# Patient Record
Sex: FEMALE | Race: WHITE | NOT HISPANIC OR LATINO | URBAN - METROPOLITAN AREA
[De-identification: names, ages, dates, MRNs, and addresses within clinical notes are randomized per-mention and may not be internally consistent; named-entity substitution may affect disease eponyms.]

---

## 2017-10-16 ENCOUNTER — EMERGENCY (EMERGENCY)
Facility: HOSPITAL | Age: 32
LOS: 1 days | Discharge: HOME | End: 2017-10-16
Admitting: EMERGENCY MEDICINE
Payer: MEDICAID

## 2017-10-16 VITALS
DIASTOLIC BLOOD PRESSURE: 99 MMHG | HEART RATE: 77 BPM | TEMPERATURE: 99 F | RESPIRATION RATE: 16 BRPM | SYSTOLIC BLOOD PRESSURE: 153 MMHG

## 2017-10-16 VITALS
DIASTOLIC BLOOD PRESSURE: 88 MMHG | TEMPERATURE: 98 F | SYSTOLIC BLOOD PRESSURE: 146 MMHG | RESPIRATION RATE: 16 BRPM | HEART RATE: 60 BPM | OXYGEN SATURATION: 100 %

## 2017-10-16 DIAGNOSIS — Z98.890 OTHER SPECIFIED POSTPROCEDURAL STATES: Chronic | ICD-10-CM

## 2017-10-16 LAB
ALBUMIN SERPL ELPH-MCNC: 4.4 G/DL — SIGNIFICANT CHANGE UP (ref 3.3–5)
ALP SERPL-CCNC: 104 U/L — SIGNIFICANT CHANGE UP (ref 40–120)
ALT FLD-CCNC: 23 U/L — SIGNIFICANT CHANGE UP (ref 4–33)
APPEARANCE UR: CLEAR — SIGNIFICANT CHANGE UP
AST SERPL-CCNC: 20 U/L — SIGNIFICANT CHANGE UP (ref 4–32)
BACTERIA # UR AUTO: SIGNIFICANT CHANGE UP
BASOPHILS # BLD AUTO: 0.04 K/UL — SIGNIFICANT CHANGE UP (ref 0–0.2)
BASOPHILS NFR BLD AUTO: 0.4 % — SIGNIFICANT CHANGE UP (ref 0–2)
BILIRUB SERPL-MCNC: 0.4 MG/DL — SIGNIFICANT CHANGE UP (ref 0.2–1.2)
BILIRUB UR-MCNC: NEGATIVE — SIGNIFICANT CHANGE UP
BLOOD UR QL VISUAL: NEGATIVE — SIGNIFICANT CHANGE UP
BUN SERPL-MCNC: 11 MG/DL — SIGNIFICANT CHANGE UP (ref 7–23)
CALCIUM SERPL-MCNC: 9.1 MG/DL — SIGNIFICANT CHANGE UP (ref 8.4–10.5)
CHLORIDE SERPL-SCNC: 106 MMOL/L — SIGNIFICANT CHANGE UP (ref 98–107)
CO2 SERPL-SCNC: 23 MMOL/L — SIGNIFICANT CHANGE UP (ref 22–31)
COLOR SPEC: SIGNIFICANT CHANGE UP
CREAT SERPL-MCNC: 0.73 MG/DL — SIGNIFICANT CHANGE UP (ref 0.5–1.3)
EOSINOPHIL # BLD AUTO: 0.22 K/UL — SIGNIFICANT CHANGE UP (ref 0–0.5)
EOSINOPHIL NFR BLD AUTO: 2 % — SIGNIFICANT CHANGE UP (ref 0–6)
GLUCOSE SERPL-MCNC: 94 MG/DL — SIGNIFICANT CHANGE UP (ref 70–99)
GLUCOSE UR-MCNC: NEGATIVE — SIGNIFICANT CHANGE UP
HCT VFR BLD CALC: 40.4 % — SIGNIFICANT CHANGE UP (ref 34.5–45)
HGB BLD-MCNC: 13.5 G/DL — SIGNIFICANT CHANGE UP (ref 11.5–15.5)
IMM GRANULOCYTES # BLD AUTO: 0.05 # — SIGNIFICANT CHANGE UP
IMM GRANULOCYTES NFR BLD AUTO: 0.4 % — SIGNIFICANT CHANGE UP (ref 0–1.5)
KETONES UR-MCNC: NEGATIVE — SIGNIFICANT CHANGE UP
LEUKOCYTE ESTERASE UR-ACNC: HIGH
LYMPHOCYTES # BLD AUTO: 2.23 K/UL — SIGNIFICANT CHANGE UP (ref 1–3.3)
LYMPHOCYTES # BLD AUTO: 20 % — SIGNIFICANT CHANGE UP (ref 13–44)
MCHC RBC-ENTMCNC: 30.5 PG — SIGNIFICANT CHANGE UP (ref 27–34)
MCHC RBC-ENTMCNC: 33.4 % — SIGNIFICANT CHANGE UP (ref 32–36)
MCV RBC AUTO: 91.2 FL — SIGNIFICANT CHANGE UP (ref 80–100)
MONOCYTES # BLD AUTO: 0.34 K/UL — SIGNIFICANT CHANGE UP (ref 0–0.9)
MONOCYTES NFR BLD AUTO: 3.1 % — SIGNIFICANT CHANGE UP (ref 2–14)
MUCOUS THREADS # UR AUTO: SIGNIFICANT CHANGE UP
NEUTROPHILS # BLD AUTO: 8.25 K/UL — HIGH (ref 1.8–7.4)
NEUTROPHILS NFR BLD AUTO: 74.1 % — SIGNIFICANT CHANGE UP (ref 43–77)
NITRITE UR-MCNC: NEGATIVE — SIGNIFICANT CHANGE UP
NON-SQ EPI CELLS # UR AUTO: <1 — SIGNIFICANT CHANGE UP
NRBC # FLD: 0 — SIGNIFICANT CHANGE UP
PH UR: 6 — SIGNIFICANT CHANGE UP (ref 4.6–8)
PLATELET # BLD AUTO: 274 K/UL — SIGNIFICANT CHANGE UP (ref 150–400)
PMV BLD: 11.2 FL — SIGNIFICANT CHANGE UP (ref 7–13)
POTASSIUM SERPL-MCNC: 4.2 MMOL/L — SIGNIFICANT CHANGE UP (ref 3.5–5.3)
POTASSIUM SERPL-SCNC: 4.2 MMOL/L — SIGNIFICANT CHANGE UP (ref 3.5–5.3)
PROT SERPL-MCNC: 7 G/DL — SIGNIFICANT CHANGE UP (ref 6–8.3)
PROT UR-MCNC: NEGATIVE — SIGNIFICANT CHANGE UP
RBC # BLD: 4.43 M/UL — SIGNIFICANT CHANGE UP (ref 3.8–5.2)
RBC # FLD: 11.5 % — SIGNIFICANT CHANGE UP (ref 10.3–14.5)
RBC CASTS # UR COMP ASSIST: SIGNIFICANT CHANGE UP (ref 0–?)
SODIUM SERPL-SCNC: 144 MMOL/L — SIGNIFICANT CHANGE UP (ref 135–145)
SP GR SPEC: 1.01 — SIGNIFICANT CHANGE UP (ref 1–1.03)
SQUAMOUS # UR AUTO: SIGNIFICANT CHANGE UP
UROBILINOGEN FLD QL: NORMAL E.U. — SIGNIFICANT CHANGE UP (ref 0.1–0.2)
WBC # BLD: 11.13 K/UL — HIGH (ref 3.8–10.5)
WBC # FLD AUTO: 11.13 K/UL — HIGH (ref 3.8–10.5)
WBC UR QL: SIGNIFICANT CHANGE UP (ref 0–?)

## 2017-10-16 PROCEDURE — 99285 EMERGENCY DEPT VISIT HI MDM: CPT

## 2017-10-16 PROCEDURE — 74177 CT ABD & PELVIS W/CONTRAST: CPT | Mod: 26

## 2017-10-16 PROCEDURE — 76830 TRANSVAGINAL US NON-OB: CPT | Mod: 26

## 2017-10-16 RX ORDER — SODIUM CHLORIDE 9 MG/ML
1000 INJECTION INTRAMUSCULAR; INTRAVENOUS; SUBCUTANEOUS ONCE
Qty: 0 | Refills: 0 | Status: COMPLETED | OUTPATIENT
Start: 2017-10-16 | End: 2017-10-16

## 2017-10-16 RX ADMIN — SODIUM CHLORIDE 1000 MILLILITER(S): 9 INJECTION INTRAMUSCULAR; INTRAVENOUS; SUBCUTANEOUS at 15:35

## 2017-10-16 NOTE — ED ADULT TRIAGE NOTE - CHIEF COMPLAINT QUOTE
pt employee, c/o LLQ pain with nausea and diarrhea on and off x 1 week with worsening today while at work.

## 2017-10-16 NOTE — ED ADULT NURSE NOTE - OBJECTIVE STATEMENT
pt presents to intake R#7 Aox4, in NAD, c/o LLQ pain x1 week, nonradiating, "cramping", worse today, with associated nausea, vomitingx1 episode, diarrhea x2. Denies fevers/ chills/ urinary symptoms/ cough/ other acute complaints. VSS. IV inserted, Bw collected and sent to lab. Awaiting US. Will continue to monitor.

## 2017-10-16 NOTE — ED PROVIDER NOTE - OBJECTIVE STATEMENT
31 year old female, PMHx of ovarian cysts; presents to the ED complaining of LLQ abdominal pain x 1 week. Patient states she has had intermittent , sharp, cramping, non-radiating abdominal pain x 1 week. 31 year old female, PMHx of ovarian cysts; presents to the ED complaining of LLQ abdominal pain x 1 week. Patient states she has had intermittent , sharp, cramping, non-radiating abdominal pain x 1 week. She states she was at work today when the pain occurred suddenly and severely, doubling her over and accompanied by one episode of NBNB vomiting. She endorses three days of diarrhea but states she gets diarrhea several times a month so she didn't attribute it to the pain. She denies chest pain, shortness of breath, recent travel, sick contacts, fevers, chills, or other complaints. Pain as now decreased significantly without intervention 3/10.

## 2017-10-16 NOTE — ED PROVIDER NOTE - PROGRESS NOTE DETAILS
JODY MOSQUEDA MD:   I was available for consultation or to see the patient directly regarding patient care at any point during the interview conducted with patient, regarding history, symptoms as well as physical exam.   I was also available to discuss or assist with the plan of care, review results of any testing and decide on disposition.

## 2017-10-16 NOTE — ED PROVIDER NOTE - CARE PLAN
Principal Discharge DX:	Ovarian cyst  Instructions for follow-up, activity and diet:	pls rest, drink plenty of fluids, motrin every 6-8 hours for pain, f/u with gyn, return for any worsening symptoms or any other concerning symptoms

## 2017-10-16 NOTE — ED PROVIDER NOTE - CHPI ED SYMPTOMS NEG
no fever/no blood in stool/no hematuria/no dysuria/no abdominal distension/no chills/no burning urination

## 2017-10-16 NOTE — ED PROVIDER NOTE - PLAN OF CARE
pls rest, drink plenty of fluids, motrin every 6-8 hours for pain, f/u with gyn, return for any worsening symptoms or any other concerning symptoms

## 2019-02-06 PROBLEM — N83.209 UNSPECIFIED OVARIAN CYST, UNSPECIFIED SIDE: Chronic | Status: ACTIVE | Noted: 2017-10-16

## 2019-02-08 ENCOUNTER — TRANSCRIPTION ENCOUNTER (OUTPATIENT)
Age: 34
End: 2019-02-08

## 2019-02-08 ENCOUNTER — APPOINTMENT (OUTPATIENT)
Dept: DERMATOLOGY | Facility: CLINIC | Age: 34
End: 2019-02-08
Payer: COMMERCIAL

## 2019-02-08 VITALS
HEIGHT: 68 IN | DIASTOLIC BLOOD PRESSURE: 84 MMHG | SYSTOLIC BLOOD PRESSURE: 120 MMHG | BODY MASS INDEX: 29.86 KG/M2 | WEIGHT: 197 LBS

## 2019-02-08 DIAGNOSIS — F17.200 NICOTINE DEPENDENCE, UNSPECIFIED, UNCOMPLICATED: ICD-10-CM

## 2019-02-08 DIAGNOSIS — Z91.89 OTHER SPECIFIED PERSONAL RISK FACTORS, NOT ELSEWHERE CLASSIFIED: ICD-10-CM

## 2019-02-08 DIAGNOSIS — Z87.2 PERSONAL HISTORY OF DISEASES OF THE SKIN AND SUBCUTANEOUS TISSUE: ICD-10-CM

## 2019-02-08 DIAGNOSIS — Z87.898 PERSONAL HISTORY OF OTHER SPECIFIED CONDITIONS: ICD-10-CM

## 2019-02-08 DIAGNOSIS — Z84.0 FAMILY HISTORY OF DISEASES OF THE SKIN AND SUBCUTANEOUS TISSUE: ICD-10-CM

## 2019-02-08 PROCEDURE — 99202 OFFICE O/P NEW SF 15 MIN: CPT

## 2019-02-08 RX ORDER — TRIAMCINOLONE ACETONIDE 1 MG/G
0.1 OINTMENT TOPICAL
Qty: 1 | Refills: 1 | Status: ACTIVE | COMMUNITY
Start: 2019-02-08 | End: 1900-01-01

## 2019-06-13 ENCOUNTER — RESULT REVIEW (OUTPATIENT)
Age: 34
End: 2019-06-13

## 2019-06-18 ENCOUNTER — APPOINTMENT (OUTPATIENT)
Dept: DERMATOLOGY | Facility: CLINIC | Age: 34
End: 2019-06-18
Payer: COMMERCIAL

## 2019-06-18 DIAGNOSIS — L73.9 FOLLICULAR DISORDER, UNSPECIFIED: ICD-10-CM

## 2019-06-18 DIAGNOSIS — D22.9 MELANOCYTIC NEVI, UNSPECIFIED: ICD-10-CM

## 2019-06-18 PROCEDURE — 99214 OFFICE O/P EST MOD 30 MIN: CPT

## 2020-04-14 NOTE — ED PROVIDER NOTE - RESPIRATORY, MLM
SW asked ISR to send updates via ECIN to [phone] spoke with SAMANTHA Rushing who states Dr. Saeed Pat was concered about the pt going back to her indep living facility. Dr. Saeed Pat would talk with family regarding going to a SNF.      Possibly B Breath sounds clear and equal bilaterally.

## 2020-04-26 ENCOUNTER — MESSAGE (OUTPATIENT)
Age: 35
End: 2020-04-26

## 2020-10-21 ENCOUNTER — LABORATORY RESULT (OUTPATIENT)
Age: 35
End: 2020-10-21

## 2020-10-21 ENCOUNTER — APPOINTMENT (OUTPATIENT)
Dept: DERMATOLOGY | Facility: CLINIC | Age: 35
End: 2020-10-21
Payer: COMMERCIAL

## 2020-10-21 VITALS — WEIGHT: 206 LBS | BODY MASS INDEX: 31.22 KG/M2 | HEIGHT: 68 IN

## 2020-10-21 VITALS — TEMPERATURE: 96.4 F

## 2020-10-21 PROCEDURE — 11102 TANGNTL BX SKIN SINGLE LES: CPT

## 2020-10-21 PROCEDURE — 99213 OFFICE O/P EST LOW 20 MIN: CPT | Mod: 25

## 2020-10-27 ENCOUNTER — APPOINTMENT (OUTPATIENT)
Dept: HUMAN REPRODUCTION | Facility: CLINIC | Age: 35
End: 2020-10-27
Payer: COMMERCIAL

## 2020-10-27 ENCOUNTER — NON-APPOINTMENT (OUTPATIENT)
Age: 35
End: 2020-10-27

## 2020-10-27 PROCEDURE — 99204 OFFICE O/P NEW MOD 45 MIN: CPT | Mod: 95

## 2021-01-20 ENCOUNTER — APPOINTMENT (OUTPATIENT)
Dept: PSYCHIATRY | Facility: CLINIC | Age: 36
End: 2021-01-20
Payer: COMMERCIAL

## 2021-01-20 PROCEDURE — 99205 OFFICE O/P NEW HI 60 MIN: CPT

## 2021-01-20 PROCEDURE — 99072 ADDL SUPL MATRL&STAF TM PHE: CPT

## 2021-01-20 RX ORDER — CLINDAMYCIN PHOSPHATE 10 MG/ML
1 LOTION TOPICAL
Qty: 1 | Refills: 6 | Status: COMPLETED | COMMUNITY
Start: 2019-06-18 | End: 2021-01-20

## 2021-01-20 RX ORDER — HYDROCORTISONE 25 MG/G
2.5 OINTMENT TOPICAL
Qty: 1 | Refills: 1 | Status: COMPLETED | COMMUNITY
Start: 2019-02-08 | End: 2021-01-20

## 2021-01-30 ENCOUNTER — APPOINTMENT (OUTPATIENT)
Dept: HUMAN REPRODUCTION | Facility: CLINIC | Age: 36
End: 2021-01-30

## 2021-02-12 ENCOUNTER — RX RENEWAL (OUTPATIENT)
Age: 36
End: 2021-02-12

## 2021-03-18 ENCOUNTER — TRANSCRIPTION ENCOUNTER (OUTPATIENT)
Age: 36
End: 2021-03-18

## 2021-03-26 ENCOUNTER — APPOINTMENT (OUTPATIENT)
Dept: PSYCHIATRY | Facility: CLINIC | Age: 36
End: 2021-03-26
Payer: COMMERCIAL

## 2021-03-26 PROCEDURE — 90791 PSYCH DIAGNOSTIC EVALUATION: CPT | Mod: 95

## 2021-04-02 ENCOUNTER — APPOINTMENT (OUTPATIENT)
Dept: PSYCHIATRY | Facility: CLINIC | Age: 36
End: 2021-04-02
Payer: COMMERCIAL

## 2021-04-02 PROCEDURE — 99214 OFFICE O/P EST MOD 30 MIN: CPT | Mod: 95

## 2021-04-02 PROCEDURE — 90832 PSYTX W PT 30 MINUTES: CPT | Mod: 95

## 2021-04-09 ENCOUNTER — APPOINTMENT (OUTPATIENT)
Dept: PSYCHIATRY | Facility: CLINIC | Age: 36
End: 2021-04-09
Payer: COMMERCIAL

## 2021-04-09 PROCEDURE — 90832 PSYTX W PT 30 MINUTES: CPT | Mod: 95

## 2021-04-16 ENCOUNTER — APPOINTMENT (OUTPATIENT)
Dept: PSYCHIATRY | Facility: CLINIC | Age: 36
End: 2021-04-16
Payer: COMMERCIAL

## 2021-04-16 PROCEDURE — 90837 PSYTX W PT 60 MINUTES: CPT | Mod: 95

## 2021-04-19 ENCOUNTER — APPOINTMENT (OUTPATIENT)
Dept: PSYCHIATRY | Facility: CLINIC | Age: 36
End: 2021-04-19
Payer: COMMERCIAL

## 2021-04-19 PROCEDURE — 99214 OFFICE O/P EST MOD 30 MIN: CPT | Mod: 95

## 2021-04-20 ENCOUNTER — APPOINTMENT (OUTPATIENT)
Dept: PSYCHIATRY | Facility: CLINIC | Age: 36
End: 2021-04-20

## 2021-04-23 ENCOUNTER — APPOINTMENT (OUTPATIENT)
Dept: PSYCHIATRY | Facility: CLINIC | Age: 36
End: 2021-04-23
Payer: COMMERCIAL

## 2021-04-23 PROCEDURE — 90837 PSYTX W PT 60 MINUTES: CPT | Mod: 95

## 2021-04-30 ENCOUNTER — RX RENEWAL (OUTPATIENT)
Age: 36
End: 2021-04-30

## 2021-04-30 ENCOUNTER — APPOINTMENT (OUTPATIENT)
Dept: PSYCHIATRY | Facility: CLINIC | Age: 36
End: 2021-04-30
Payer: COMMERCIAL

## 2021-04-30 PROCEDURE — 90837 PSYTX W PT 60 MINUTES: CPT | Mod: 95

## 2021-05-07 ENCOUNTER — APPOINTMENT (OUTPATIENT)
Dept: PSYCHIATRY | Facility: CLINIC | Age: 36
End: 2021-05-07
Payer: COMMERCIAL

## 2021-05-07 DIAGNOSIS — G47.00 INSOMNIA, UNSPECIFIED: ICD-10-CM

## 2021-05-07 PROCEDURE — 90837 PSYTX W PT 60 MINUTES: CPT | Mod: 95

## 2021-05-11 ENCOUNTER — RESULT REVIEW (OUTPATIENT)
Age: 36
End: 2021-05-11

## 2021-05-14 ENCOUNTER — OUTPATIENT (OUTPATIENT)
Dept: OUTPATIENT SERVICES | Facility: HOSPITAL | Age: 36
LOS: 1 days | End: 2021-05-14
Payer: COMMERCIAL

## 2021-05-14 ENCOUNTER — APPOINTMENT (OUTPATIENT)
Dept: ULTRASOUND IMAGING | Facility: CLINIC | Age: 36
End: 2021-05-14
Payer: COMMERCIAL

## 2021-05-14 ENCOUNTER — APPOINTMENT (OUTPATIENT)
Dept: PSYCHIATRY | Facility: CLINIC | Age: 36
End: 2021-05-14
Payer: COMMERCIAL

## 2021-05-14 DIAGNOSIS — Z98.890 OTHER SPECIFIED POSTPROCEDURAL STATES: Chronic | ICD-10-CM

## 2021-05-14 DIAGNOSIS — Z00.8 ENCOUNTER FOR OTHER GENERAL EXAMINATION: ICD-10-CM

## 2021-05-14 PROCEDURE — 76856 US EXAM PELVIC COMPLETE: CPT

## 2021-05-14 PROCEDURE — 76856 US EXAM PELVIC COMPLETE: CPT | Mod: 26

## 2021-05-14 PROCEDURE — 76830 TRANSVAGINAL US NON-OB: CPT

## 2021-05-14 PROCEDURE — 76830 TRANSVAGINAL US NON-OB: CPT | Mod: 26

## 2021-05-14 PROCEDURE — 90832 PSYTX W PT 30 MINUTES: CPT | Mod: 95

## 2021-05-18 ENCOUNTER — APPOINTMENT (OUTPATIENT)
Dept: PSYCHIATRY | Facility: CLINIC | Age: 36
End: 2021-05-18
Payer: COMMERCIAL

## 2021-05-18 PROCEDURE — 99214 OFFICE O/P EST MOD 30 MIN: CPT | Mod: 95

## 2021-05-18 RX ORDER — SERTRALINE 25 MG/1
25 TABLET, FILM COATED ORAL DAILY
Qty: 30 | Refills: 0 | Status: COMPLETED | COMMUNITY
Start: 2021-04-02 | End: 2021-05-18

## 2021-05-21 ENCOUNTER — APPOINTMENT (OUTPATIENT)
Dept: PSYCHIATRY | Facility: CLINIC | Age: 36
End: 2021-05-21
Payer: COMMERCIAL

## 2021-05-21 PROCEDURE — 90837 PSYTX W PT 60 MINUTES: CPT | Mod: 95

## 2021-05-28 ENCOUNTER — APPOINTMENT (OUTPATIENT)
Dept: PSYCHIATRY | Facility: CLINIC | Age: 36
End: 2021-05-28
Payer: COMMERCIAL

## 2021-05-28 PROCEDURE — 90837 PSYTX W PT 60 MINUTES: CPT | Mod: 95

## 2021-06-04 ENCOUNTER — APPOINTMENT (OUTPATIENT)
Dept: PSYCHIATRY | Facility: CLINIC | Age: 36
End: 2021-06-04
Payer: COMMERCIAL

## 2021-06-04 PROCEDURE — 90837 PSYTX W PT 60 MINUTES: CPT | Mod: 95

## 2021-06-11 ENCOUNTER — APPOINTMENT (OUTPATIENT)
Dept: PSYCHIATRY | Facility: CLINIC | Age: 36
End: 2021-06-11
Payer: COMMERCIAL

## 2021-06-11 PROCEDURE — 90837 PSYTX W PT 60 MINUTES: CPT | Mod: 95

## 2021-06-14 ENCOUNTER — RX RENEWAL (OUTPATIENT)
Age: 36
End: 2021-06-14

## 2021-06-15 ENCOUNTER — APPOINTMENT (OUTPATIENT)
Dept: PSYCHIATRY | Facility: CLINIC | Age: 36
End: 2021-06-15
Payer: COMMERCIAL

## 2021-06-15 PROCEDURE — 99214 OFFICE O/P EST MOD 30 MIN: CPT | Mod: 95

## 2021-06-18 ENCOUNTER — APPOINTMENT (OUTPATIENT)
Dept: PSYCHIATRY | Facility: CLINIC | Age: 36
End: 2021-06-18

## 2021-06-25 ENCOUNTER — APPOINTMENT (OUTPATIENT)
Dept: PSYCHIATRY | Facility: CLINIC | Age: 36
End: 2021-06-25
Payer: COMMERCIAL

## 2021-06-25 PROCEDURE — 90837 PSYTX W PT 60 MINUTES: CPT | Mod: 95

## 2021-07-09 ENCOUNTER — APPOINTMENT (OUTPATIENT)
Dept: PSYCHIATRY | Facility: CLINIC | Age: 36
End: 2021-07-09
Payer: COMMERCIAL

## 2021-07-09 PROCEDURE — 90837 PSYTX W PT 60 MINUTES: CPT | Mod: 95

## 2021-07-15 ENCOUNTER — TRANSCRIPTION ENCOUNTER (OUTPATIENT)
Age: 36
End: 2021-07-15

## 2021-07-16 ENCOUNTER — APPOINTMENT (OUTPATIENT)
Dept: PSYCHIATRY | Facility: CLINIC | Age: 36
End: 2021-07-16
Payer: COMMERCIAL

## 2021-07-16 PROCEDURE — 90837 PSYTX W PT 60 MINUTES: CPT | Mod: 95

## 2021-07-21 ENCOUNTER — APPOINTMENT (OUTPATIENT)
Dept: PSYCHIATRY | Facility: CLINIC | Age: 36
End: 2021-07-21

## 2021-07-23 ENCOUNTER — APPOINTMENT (OUTPATIENT)
Dept: PSYCHIATRY | Facility: CLINIC | Age: 36
End: 2021-07-23
Payer: COMMERCIAL

## 2021-07-23 PROCEDURE — 90837 PSYTX W PT 60 MINUTES: CPT | Mod: 95

## 2021-07-30 ENCOUNTER — TRANSCRIPTION ENCOUNTER (OUTPATIENT)
Age: 36
End: 2021-07-30

## 2021-07-30 ENCOUNTER — APPOINTMENT (OUTPATIENT)
Dept: PSYCHIATRY | Facility: CLINIC | Age: 36
End: 2021-07-30
Payer: COMMERCIAL

## 2021-07-30 DIAGNOSIS — F41.9 ANXIETY DISORDER, UNSPECIFIED: ICD-10-CM

## 2021-07-30 DIAGNOSIS — F32.9 ANXIETY DISORDER, UNSPECIFIED: ICD-10-CM

## 2021-07-30 PROCEDURE — 90837 PSYTX W PT 60 MINUTES: CPT | Mod: 95

## 2021-08-04 ENCOUNTER — LABORATORY RESULT (OUTPATIENT)
Age: 36
End: 2021-08-04

## 2021-08-05 ENCOUNTER — APPOINTMENT (OUTPATIENT)
Dept: DERMATOLOGY | Facility: CLINIC | Age: 36
End: 2021-08-05
Payer: COMMERCIAL

## 2021-08-05 DIAGNOSIS — D48.9 NEOPLASM OF UNCERTAIN BEHAVIOR, UNSPECIFIED: ICD-10-CM

## 2021-08-05 PROCEDURE — 11105 PUNCH BX SKIN EA SEP/ADDL: CPT

## 2021-08-05 PROCEDURE — 11104 PUNCH BX SKIN SINGLE LESION: CPT

## 2021-08-05 PROCEDURE — 99214 OFFICE O/P EST MOD 30 MIN: CPT | Mod: 25

## 2021-08-05 NOTE — ASSESSMENT
[FreeTextEntry1] : rash\par DDx includes eczematous vs. urticarial\par -education\par -gentle skin care reviewed\par -pred 60 mg PO daily x 1 week then 40 mg PO daily x1 week; SED\par will consider tapering dose on f/u and based on bx results\par c/w clobetasol BID; SED\par \par PUNCH BIOPSY Location x2; right thigh sup and inf\par Diagnosis:  r/o urticarial vasculitis\par \par 3 mm Punch biopsy performed today over above location, risks and benefits discussed including incomplete removal, not enough tissue for diagnosis scarring and infection, informed consent obtained, lesion cleaned with alcohol and anesthetized with 1% lido+epi, 1 cc total, hemostasis obtained 4-0 nylon suture, vaseline and bandaid placed, tolerated well, wound care reviewed, specimen sent to pathology, will return in 2 weeks for suture removal.\par \par

## 2021-08-05 NOTE — PHYSICAL EXAM
[FreeTextEntry3] : AAOx3, pleasant, NAD, no visual lymphadenopathy\par hair, scalp, face, nose, eyelids, ears, lips, oropharynx, neck, chest, abdomen, back, right arm, left arm, nails, and hands examined with all normal findings,\par pertinent findings include:\par \par pink plaques diffusely on b/l thighs and legs

## 2021-08-05 NOTE — HISTORY OF PRESENT ILLNESS
[FreeTextEntry1] : rash on body [de-identified] : 35 year old female with rash on body. very itchy. no tx tried aside from clobetasol ointment. worst in morning and night. going on for several weeks. under stress.

## 2021-08-06 ENCOUNTER — TRANSCRIPTION ENCOUNTER (OUTPATIENT)
Age: 36
End: 2021-08-06

## 2021-08-06 ENCOUNTER — APPOINTMENT (OUTPATIENT)
Dept: PSYCHIATRY | Facility: CLINIC | Age: 36
End: 2021-08-06
Payer: COMMERCIAL

## 2021-08-06 PROCEDURE — 90837 PSYTX W PT 60 MINUTES: CPT | Mod: 95

## 2021-08-13 ENCOUNTER — NON-APPOINTMENT (OUTPATIENT)
Age: 36
End: 2021-08-13

## 2021-08-13 ENCOUNTER — TRANSCRIPTION ENCOUNTER (OUTPATIENT)
Age: 36
End: 2021-08-13

## 2021-08-20 ENCOUNTER — APPOINTMENT (OUTPATIENT)
Dept: DERMATOLOGY | Facility: CLINIC | Age: 36
End: 2021-08-20

## 2021-08-27 ENCOUNTER — APPOINTMENT (OUTPATIENT)
Dept: PSYCHIATRY | Facility: CLINIC | Age: 36
End: 2021-08-27
Payer: COMMERCIAL

## 2021-08-27 PROCEDURE — 90837 PSYTX W PT 60 MINUTES: CPT | Mod: 95

## 2021-09-03 ENCOUNTER — APPOINTMENT (OUTPATIENT)
Dept: PSYCHIATRY | Facility: CLINIC | Age: 36
End: 2021-09-03
Payer: COMMERCIAL

## 2021-09-03 PROCEDURE — 99214 OFFICE O/P EST MOD 30 MIN: CPT | Mod: 95

## 2021-09-03 RX ORDER — PREDNISONE 20 MG/1
20 TABLET ORAL
Qty: 35 | Refills: 0 | Status: COMPLETED | COMMUNITY
Start: 2021-08-05 | End: 2021-09-03

## 2021-09-10 ENCOUNTER — APPOINTMENT (OUTPATIENT)
Dept: PSYCHIATRY | Facility: CLINIC | Age: 36
End: 2021-09-10

## 2021-09-14 ENCOUNTER — APPOINTMENT (OUTPATIENT)
Dept: DERMATOLOGY | Facility: CLINIC | Age: 36
End: 2021-09-14

## 2021-09-14 ENCOUNTER — NON-APPOINTMENT (OUTPATIENT)
Age: 36
End: 2021-09-14

## 2021-09-17 ENCOUNTER — APPOINTMENT (OUTPATIENT)
Dept: PSYCHIATRY | Facility: CLINIC | Age: 36
End: 2021-09-17
Payer: COMMERCIAL

## 2021-09-17 PROCEDURE — 90837 PSYTX W PT 60 MINUTES: CPT | Mod: 95

## 2021-09-24 ENCOUNTER — APPOINTMENT (OUTPATIENT)
Dept: PSYCHIATRY | Facility: CLINIC | Age: 36
End: 2021-09-24
Payer: COMMERCIAL

## 2021-09-24 PROCEDURE — 90837 PSYTX W PT 60 MINUTES: CPT | Mod: 95

## 2021-10-01 ENCOUNTER — APPOINTMENT (OUTPATIENT)
Dept: PSYCHIATRY | Facility: CLINIC | Age: 36
End: 2021-10-01

## 2021-10-08 ENCOUNTER — APPOINTMENT (OUTPATIENT)
Dept: PSYCHIATRY | Facility: CLINIC | Age: 36
End: 2021-10-08
Payer: COMMERCIAL

## 2021-10-08 PROCEDURE — 90837 PSYTX W PT 60 MINUTES: CPT | Mod: 95

## 2021-10-15 ENCOUNTER — APPOINTMENT (OUTPATIENT)
Dept: PSYCHIATRY | Facility: CLINIC | Age: 36
End: 2021-10-15
Payer: COMMERCIAL

## 2021-10-15 PROCEDURE — 90837 PSYTX W PT 60 MINUTES: CPT | Mod: 95

## 2021-10-22 ENCOUNTER — APPOINTMENT (OUTPATIENT)
Dept: PSYCHIATRY | Facility: CLINIC | Age: 36
End: 2021-10-22
Payer: COMMERCIAL

## 2021-10-22 PROCEDURE — 90837 PSYTX W PT 60 MINUTES: CPT | Mod: 95

## 2021-10-27 ENCOUNTER — APPOINTMENT (OUTPATIENT)
Dept: DERMATOLOGY | Facility: CLINIC | Age: 36
End: 2021-10-27
Payer: COMMERCIAL

## 2021-10-27 DIAGNOSIS — R21 RASH AND OTHER NONSPECIFIC SKIN ERUPTION: ICD-10-CM

## 2021-10-27 DIAGNOSIS — L30.9 DERMATITIS, UNSPECIFIED: ICD-10-CM

## 2021-10-27 PROCEDURE — 99214 OFFICE O/P EST MOD 30 MIN: CPT

## 2021-10-27 NOTE — HISTORY OF PRESENT ILLNESS
[FreeTextEntry1] : f/u mandi [de-identified] : 35 year old female here with rash on left abdomen and back. 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

## 2021-10-27 NOTE — PHYSICAL EXAM
[FreeTextEntry3] : AAOx3, pleasant, NAD, no visual lymphadenopathy\par hair, scalp, face, nose, eyelids, ears, lips, oropharynx, neck, chest, abdomen, back, right arm, left arm, nails, and hands examined with all normal findings,\par pertinent findings include:\par \par eczematous patch on left abdomen and back\par acneiform papules on chest

## 2021-10-27 NOTE — ASSESSMENT
[FreeTextEntry1] : urticarial vasculitis\par resolved\par \par eczematous derm\par -education\par -gentle skin care reviewed\par clobetasol ointment BID PRN; SED\par \par acne\par failed clinda\par BP is drying\par doxy 100 mg PO daily x6 weeks; SED

## 2021-11-05 ENCOUNTER — APPOINTMENT (OUTPATIENT)
Dept: PSYCHIATRY | Facility: CLINIC | Age: 36
End: 2021-11-05
Payer: COMMERCIAL

## 2021-11-05 PROCEDURE — 90837 PSYTX W PT 60 MINUTES: CPT | Mod: 95

## 2021-11-12 ENCOUNTER — APPOINTMENT (OUTPATIENT)
Dept: PSYCHIATRY | Facility: CLINIC | Age: 36
End: 2021-11-12
Payer: COMMERCIAL

## 2021-11-12 PROCEDURE — 90837 PSYTX W PT 60 MINUTES: CPT | Mod: 95

## 2021-12-03 ENCOUNTER — APPOINTMENT (OUTPATIENT)
Dept: PSYCHIATRY | Facility: CLINIC | Age: 36
End: 2021-12-03
Payer: COMMERCIAL

## 2021-12-03 PROCEDURE — 90837 PSYTX W PT 60 MINUTES: CPT | Mod: 95

## 2022-01-07 ENCOUNTER — APPOINTMENT (OUTPATIENT)
Dept: PSYCHIATRY | Facility: CLINIC | Age: 37
End: 2022-01-07
Payer: COMMERCIAL

## 2022-01-07 PROCEDURE — 90837 PSYTX W PT 60 MINUTES: CPT | Mod: 95

## 2022-01-14 ENCOUNTER — APPOINTMENT (OUTPATIENT)
Dept: PSYCHIATRY | Facility: CLINIC | Age: 37
End: 2022-01-14
Payer: COMMERCIAL

## 2022-01-14 PROCEDURE — 90837 PSYTX W PT 60 MINUTES: CPT | Mod: 95

## 2022-01-21 ENCOUNTER — APPOINTMENT (OUTPATIENT)
Dept: PSYCHIATRY | Facility: CLINIC | Age: 37
End: 2022-01-21
Payer: COMMERCIAL

## 2022-01-21 PROCEDURE — 90837 PSYTX W PT 60 MINUTES: CPT | Mod: 95

## 2022-01-28 ENCOUNTER — APPOINTMENT (OUTPATIENT)
Dept: PSYCHIATRY | Facility: CLINIC | Age: 37
End: 2022-01-28
Payer: COMMERCIAL

## 2022-01-28 PROCEDURE — 90837 PSYTX W PT 60 MINUTES: CPT | Mod: 95

## 2022-02-04 ENCOUNTER — APPOINTMENT (OUTPATIENT)
Dept: PSYCHIATRY | Facility: CLINIC | Age: 37
End: 2022-02-04
Payer: COMMERCIAL

## 2022-02-04 PROCEDURE — 90837 PSYTX W PT 60 MINUTES: CPT | Mod: 95

## 2022-02-11 ENCOUNTER — APPOINTMENT (OUTPATIENT)
Dept: PSYCHIATRY | Facility: CLINIC | Age: 37
End: 2022-02-11

## 2022-02-25 ENCOUNTER — APPOINTMENT (OUTPATIENT)
Dept: PSYCHIATRY | Facility: CLINIC | Age: 37
End: 2022-02-25
Payer: COMMERCIAL

## 2022-02-25 PROCEDURE — 90837 PSYTX W PT 60 MINUTES: CPT | Mod: 95

## 2022-03-04 ENCOUNTER — APPOINTMENT (OUTPATIENT)
Dept: PSYCHIATRY | Facility: CLINIC | Age: 37
End: 2022-03-04
Payer: COMMERCIAL

## 2022-03-04 PROCEDURE — 90837 PSYTX W PT 60 MINUTES: CPT | Mod: 95

## 2022-03-10 ENCOUNTER — RX RENEWAL (OUTPATIENT)
Age: 37
End: 2022-03-10

## 2022-03-11 ENCOUNTER — APPOINTMENT (OUTPATIENT)
Dept: DERMATOLOGY | Facility: CLINIC | Age: 37
End: 2022-03-11
Payer: COMMERCIAL

## 2022-03-11 ENCOUNTER — APPOINTMENT (OUTPATIENT)
Dept: PSYCHIATRY | Facility: CLINIC | Age: 37
End: 2022-03-11
Payer: COMMERCIAL

## 2022-03-11 DIAGNOSIS — L70.0 ACNE VULGARIS: ICD-10-CM

## 2022-03-11 PROCEDURE — 99214 OFFICE O/P EST MOD 30 MIN: CPT | Mod: 95

## 2022-03-11 PROCEDURE — 90837 PSYTX W PT 60 MINUTES: CPT | Mod: 95

## 2022-03-11 NOTE — HISTORY OF PRESENT ILLNESS
[Home] : at home, [unfilled] , at the time of the visit. [Medical Office: ([facility])___] : at the medical office located in  [Verbal consent obtained from patient] : the patient, [unfilled] [FreeTextEntry1] : f/u acne [de-identified] : 36 year old female for f/u acne. doxy is not working for her. continues to have white heads on back and face.

## 2022-03-11 NOTE — ASSESSMENT
[FreeTextEntry1] : acne\par face and back\par failed doxy\par discussed wally and isotretinoin but patient defers given planning on child bearing in next year\par clinda lotion on back; SED\par Arazlo on back in covered; SED\par tretinoin 0.025% cream on face; SED\par \par This was a Telehealth encounter in which two-way real-time audio and video communication was utilized. Risks and benefits of receiving Telehealth services has been discussed with the patient. The patient has been given ample opportunity to discuss any questions regarding [facility] telehealth services. All of the patients questions have been answered to satisfaction.\par \par \par

## 2022-03-18 ENCOUNTER — APPOINTMENT (OUTPATIENT)
Dept: PSYCHIATRY | Facility: CLINIC | Age: 37
End: 2022-03-18
Payer: COMMERCIAL

## 2022-03-18 PROCEDURE — 90837 PSYTX W PT 60 MINUTES: CPT | Mod: 95

## 2022-03-25 ENCOUNTER — APPOINTMENT (OUTPATIENT)
Dept: PSYCHIATRY | Facility: CLINIC | Age: 37
End: 2022-03-25
Payer: COMMERCIAL

## 2022-03-25 PROCEDURE — 90837 PSYTX W PT 60 MINUTES: CPT | Mod: 95

## 2022-04-01 ENCOUNTER — APPOINTMENT (OUTPATIENT)
Dept: PSYCHIATRY | Facility: CLINIC | Age: 37
End: 2022-04-01

## 2022-04-08 ENCOUNTER — APPOINTMENT (OUTPATIENT)
Dept: PSYCHIATRY | Facility: CLINIC | Age: 37
End: 2022-04-08
Payer: COMMERCIAL

## 2022-04-08 PROCEDURE — 90837 PSYTX W PT 60 MINUTES: CPT | Mod: 95

## 2022-04-15 ENCOUNTER — APPOINTMENT (OUTPATIENT)
Dept: PSYCHIATRY | Facility: CLINIC | Age: 37
End: 2022-04-15
Payer: COMMERCIAL

## 2022-04-15 PROCEDURE — 90837 PSYTX W PT 60 MINUTES: CPT | Mod: 95

## 2022-04-22 ENCOUNTER — APPOINTMENT (OUTPATIENT)
Dept: PSYCHIATRY | Facility: CLINIC | Age: 37
End: 2022-04-22
Payer: COMMERCIAL

## 2022-04-22 PROCEDURE — 90837 PSYTX W PT 60 MINUTES: CPT | Mod: 95

## 2022-04-29 ENCOUNTER — APPOINTMENT (OUTPATIENT)
Dept: PSYCHIATRY | Facility: CLINIC | Age: 37
End: 2022-04-29
Payer: COMMERCIAL

## 2022-04-29 PROCEDURE — 90837 PSYTX W PT 60 MINUTES: CPT | Mod: 95

## 2022-05-06 ENCOUNTER — APPOINTMENT (OUTPATIENT)
Dept: PSYCHIATRY | Facility: CLINIC | Age: 37
End: 2022-05-06
Payer: COMMERCIAL

## 2022-05-06 DIAGNOSIS — F43.10 POST-TRAUMATIC STRESS DISORDER, UNSPECIFIED: ICD-10-CM

## 2022-05-06 PROCEDURE — 90834 PSYTX W PT 45 MINUTES: CPT | Mod: 95

## 2022-05-13 ENCOUNTER — APPOINTMENT (OUTPATIENT)
Dept: PSYCHIATRY | Facility: CLINIC | Age: 37
End: 2022-05-13
Payer: COMMERCIAL

## 2022-05-13 PROCEDURE — 90837 PSYTX W PT 60 MINUTES: CPT | Mod: 95

## 2022-05-20 ENCOUNTER — APPOINTMENT (OUTPATIENT)
Dept: PSYCHIATRY | Facility: CLINIC | Age: 37
End: 2022-05-20
Payer: COMMERCIAL

## 2022-05-20 PROCEDURE — 90837 PSYTX W PT 60 MINUTES: CPT | Mod: 95

## 2022-06-14 ENCOUNTER — APPOINTMENT (OUTPATIENT)
Dept: PSYCHIATRY | Facility: CLINIC | Age: 37
End: 2022-06-14
Payer: COMMERCIAL

## 2022-06-14 PROCEDURE — 90837 PSYTX W PT 60 MINUTES: CPT | Mod: 95

## 2022-06-21 ENCOUNTER — APPOINTMENT (OUTPATIENT)
Dept: PSYCHIATRY | Facility: CLINIC | Age: 37
End: 2022-06-21
Payer: COMMERCIAL

## 2022-06-21 PROCEDURE — 90837 PSYTX W PT 60 MINUTES: CPT | Mod: 95

## 2022-06-28 ENCOUNTER — APPOINTMENT (OUTPATIENT)
Dept: PSYCHIATRY | Facility: CLINIC | Age: 37
End: 2022-06-28
Payer: COMMERCIAL

## 2022-06-28 PROCEDURE — 90837 PSYTX W PT 60 MINUTES: CPT | Mod: 95

## 2022-07-05 ENCOUNTER — NON-APPOINTMENT (OUTPATIENT)
Age: 37
End: 2022-07-05

## 2022-07-07 ENCOUNTER — TRANSCRIPTION ENCOUNTER (OUTPATIENT)
Age: 37
End: 2022-07-07

## 2022-07-07 RX ORDER — CLOBETASOL PROPIONATE 0.5 MG/G
0.05 OINTMENT TOPICAL
Qty: 60 | Refills: 4 | Status: ACTIVE | COMMUNITY
Start: 2019-06-18 | End: 1900-01-01

## 2022-07-12 ENCOUNTER — APPOINTMENT (OUTPATIENT)
Dept: PSYCHIATRY | Facility: CLINIC | Age: 37
End: 2022-07-12

## 2022-07-12 PROCEDURE — 90837 PSYTX W PT 60 MINUTES: CPT

## 2022-07-19 ENCOUNTER — APPOINTMENT (OUTPATIENT)
Dept: PSYCHIATRY | Facility: CLINIC | Age: 37
End: 2022-07-19

## 2022-07-19 PROCEDURE — 90837 PSYTX W PT 60 MINUTES: CPT | Mod: 95

## 2022-07-26 ENCOUNTER — APPOINTMENT (OUTPATIENT)
Dept: PSYCHIATRY | Facility: CLINIC | Age: 37
End: 2022-07-26

## 2022-08-02 ENCOUNTER — APPOINTMENT (OUTPATIENT)
Dept: PSYCHIATRY | Facility: CLINIC | Age: 37
End: 2022-08-02

## 2022-08-02 PROCEDURE — 90837 PSYTX W PT 60 MINUTES: CPT | Mod: 95

## 2022-08-09 ENCOUNTER — APPOINTMENT (OUTPATIENT)
Dept: PSYCHIATRY | Facility: CLINIC | Age: 37
End: 2022-08-09

## 2022-08-09 PROCEDURE — 90837 PSYTX W PT 60 MINUTES: CPT | Mod: 95

## 2022-08-16 ENCOUNTER — APPOINTMENT (OUTPATIENT)
Dept: PSYCHIATRY | Facility: CLINIC | Age: 37
End: 2022-08-16

## 2022-08-23 ENCOUNTER — APPOINTMENT (OUTPATIENT)
Dept: PSYCHIATRY | Facility: CLINIC | Age: 37
End: 2022-08-23

## 2022-08-23 PROCEDURE — 90837 PSYTX W PT 60 MINUTES: CPT | Mod: 95

## 2022-08-30 ENCOUNTER — APPOINTMENT (OUTPATIENT)
Dept: PSYCHIATRY | Facility: CLINIC | Age: 37
End: 2022-08-30

## 2022-08-30 PROCEDURE — 90837 PSYTX W PT 60 MINUTES: CPT

## 2022-08-31 ENCOUNTER — APPOINTMENT (OUTPATIENT)
Dept: PSYCHIATRY | Facility: CLINIC | Age: 37
End: 2022-08-31

## 2022-08-31 PROCEDURE — 99214 OFFICE O/P EST MOD 30 MIN: CPT | Mod: 95

## 2022-08-31 RX ORDER — SERTRALINE HYDROCHLORIDE 50 MG/1
50 TABLET, FILM COATED ORAL
Qty: 30 | Refills: 0 | Status: COMPLETED | COMMUNITY
Start: 2021-06-15 | End: 2022-08-31

## 2022-08-31 RX ORDER — SERTRALINE HYDROCHLORIDE 100 MG/1
100 TABLET, FILM COATED ORAL DAILY
Qty: 30 | Refills: 0 | Status: COMPLETED | COMMUNITY
Start: 2021-01-20 | End: 2022-08-31

## 2022-09-06 ENCOUNTER — APPOINTMENT (OUTPATIENT)
Dept: PSYCHIATRY | Facility: CLINIC | Age: 37
End: 2022-09-06

## 2022-09-06 PROCEDURE — 90837 PSYTX W PT 60 MINUTES: CPT | Mod: 95

## 2022-09-13 ENCOUNTER — APPOINTMENT (OUTPATIENT)
Dept: PSYCHIATRY | Facility: CLINIC | Age: 37
End: 2022-09-13

## 2022-09-13 PROCEDURE — 90837 PSYTX W PT 60 MINUTES: CPT | Mod: 95

## 2022-09-20 ENCOUNTER — APPOINTMENT (OUTPATIENT)
Dept: PSYCHIATRY | Facility: CLINIC | Age: 37
End: 2022-09-20

## 2022-09-27 ENCOUNTER — APPOINTMENT (OUTPATIENT)
Dept: PSYCHIATRY | Facility: CLINIC | Age: 37
End: 2022-09-27

## 2022-09-27 ENCOUNTER — NON-APPOINTMENT (OUTPATIENT)
Age: 37
End: 2022-09-27

## 2022-09-27 PROCEDURE — 99214 OFFICE O/P EST MOD 30 MIN: CPT | Mod: 95

## 2022-10-04 ENCOUNTER — NON-APPOINTMENT (OUTPATIENT)
Age: 37
End: 2022-10-04

## 2022-10-04 ENCOUNTER — APPOINTMENT (OUTPATIENT)
Dept: PSYCHIATRY | Facility: CLINIC | Age: 37
End: 2022-10-04

## 2022-10-11 ENCOUNTER — APPOINTMENT (OUTPATIENT)
Dept: PSYCHIATRY | Facility: CLINIC | Age: 37
End: 2022-10-11

## 2022-10-18 ENCOUNTER — APPOINTMENT (OUTPATIENT)
Dept: PSYCHIATRY | Facility: CLINIC | Age: 37
End: 2022-10-18

## 2022-10-18 PROCEDURE — 90837 PSYTX W PT 60 MINUTES: CPT | Mod: 95

## 2022-10-18 NOTE — PLAN
[FreeTextEntry2] : 1. To alleviate sx of depression and anxiety by utilizing coping skills \par 2. To educate pt on PTSD\par 3. To explore source of disturbances. [Psychoeducation] : Psychoeducation  [Supportive Therapy] : Supportive Therapy [de-identified] : Therapist and pt had session through TEB. Pt was cooperative and forthcoming with information. MSE was WNL. Pt discussed ongoing issues with her family. Pt reported that she has had to put extensive boundaries as her family is asking for money. Discussed what emotions and thoughts arise when family needs money. Pt continue to utilize grounding skills to help alleviate anxieties . Pt mentioned having an argument with s/o. Therapist went over effective communcation. Denies s/i, h/i and/or plan. Denies av hallucinations.

## 2022-10-18 NOTE — END OF VISIT
[Individual Psychotherapy for 53+ minutes] : Individual Psychotherapy for 53+ minutes  [Teletherapy Service Provided] : The services provided in this session were delivered via tele-therapy [FreeTextEntry3] : [facility]. [place] [FreeTextEntry5] : [address] [facility]

## 2022-10-25 ENCOUNTER — APPOINTMENT (OUTPATIENT)
Dept: PSYCHIATRY | Facility: CLINIC | Age: 37
End: 2022-10-25

## 2022-10-25 PROCEDURE — 99214 OFFICE O/P EST MOD 30 MIN: CPT

## 2022-10-25 PROCEDURE — 90837 PSYTX W PT 60 MINUTES: CPT
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
## 2022-10-25 NOTE — DISCUSSION/SUMMARY
[FreeTextEntry1] : Assessment: Patient is a 36 yo  female with h/o anxiety, depression and alcohol and cannabis use disorder seen today for medication management. Patient is compliant with medications and tolerating without any new reported side effects.  I-stop reviewed and no issues noticed.\par \par \par PLAN:\par Start Lexapro 5 mg 1 week prior to period. \par Increase Lexapro 10 to 15 mg PO QD for depression and anxiety\par D/C Trazodone 50 mg PO QHS for insomnia\par D/C Zoloft 150 mg PO QAM for depression, anxiety, and PTSD\par - Discussed risks and benefits of medications including side effects of GI and sexual side effects with SSRI. Alternative strategies including no intervention discussed with patient. Patient consents to current medications as prescribed.\par - Discussed with patient regarding importance of abstinence and sobriety from alcohol and drugs. Educated about relationship between worsening mood/anxiety symptoms and drug use and improvement of symptoms with abstinence. \par - Discussed about unpredictable effects including cardiorespiratory collapse from the combination of illicit drugs and prescribed medications. Patient verbalized understanding.\par - Patient understands to contact clinic prn with concerns and agrees to call 911 or go to nearest ER if symptoms worsen.\par - Next appointment made in 1 month Patient left the office without any distress.\par
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
## 2022-10-25 NOTE — PHYSICAL EXAM
[None] : none [Anxious] : anxious [Afraid] : afraid [Dysphoric] : dysphoric [Constricted] : constricted [Normal] : good [FreeTextEntry8] : "I'm depressed." better [FreeTextEntry9] : better

## 2022-10-25 NOTE — FAMILY HISTORY
[FreeTextEntry1] : Mother: undiagnosed depression and anxiety\par Father: substance use\par Mother's side: depression

## 2022-10-25 NOTE — END OF VISIT
[Individual Psychotherapy for 53+ minutes] : Individual Psychotherapy for 53+ minutes  [FreeTextEntry3] : [facility]. [place] [FreeTextEntry5] : [address] [facility]

## 2022-10-25 NOTE — CURRENT PSYCHIATRIC SYMPTOMS
[Depressed Mood] : depressed mood [Anhedonia] : anhedonia [Guilt] : feeling guilty [Decreased Concentration] : decreased concentrating ability [Psychomotor Agitation] : psychomotor agitation [Anorexia] : anorexia [Highly Irritable] : high irritability [Increased Activity] : increased activity [Distractibility] : distractibility [Excessive Worry] : excessive worry [Ruminations] : ruminations [Obsessions] : obsessions [Re-experiencing] : re-experiencing [Hypochondriasis] : hypochondriasis [Panic] : panic  [de-identified] : better [de-identified] : FOI. better [de-identified] : denies [de-identified] : better [de-identified] : denies [de-identified] : denies [de-identified] : denies

## 2022-11-01 ENCOUNTER — APPOINTMENT (OUTPATIENT)
Dept: PSYCHIATRY | Facility: CLINIC | Age: 37
End: 2022-11-01

## 2022-11-01 PROCEDURE — 90837 PSYTX W PT 60 MINUTES: CPT | Mod: 95

## 2022-11-02 NOTE — PLAN
[FreeTextEntry2] : 1. To alleviate sx of depression and anxiety by utilizing coping skills \par 2. To educate pt on PTSD\par 3. To explore source of disturbances. [Psychoeducation] : Psychoeducation  [Supportive Therapy] : Supportive Therapy [de-identified] : Therapist and pt had session through TEB. Pt was cooperative and forthcoming with information. MSE was WNL. Pt discussed ongoing issues with her significant other. Pt discussed medication management and her views on meds. Discussed possibly starting a family with her current boyfriend. Continue to work with pt on meditation. Completed pendulating and mediatation today and had positive results.  Denies s/i, h/i and/or plan. Denies av hallucinations.

## 2022-11-02 NOTE — END OF VISIT
[Individual Psychotherapy for 53+ minutes] : Individual Psychotherapy for 53+ minutes  [Teletherapy Service Provided] : The services provided in this session were delivered via tele-therapy [FreeTextEntry3] : [facility]. [place] [FreeTextEntry5] : [address] [facility]

## 2022-11-08 ENCOUNTER — APPOINTMENT (OUTPATIENT)
Dept: PSYCHIATRY | Facility: CLINIC | Age: 37
End: 2022-11-08

## 2022-11-08 PROCEDURE — 90837 PSYTX W PT 60 MINUTES: CPT | Mod: 95
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
## 2022-11-08 NOTE — END OF VISIT
[Individual Psychotherapy for 53+ minutes] : Individual Psychotherapy for 53+ minutes  [Teletherapy Service Provided] : The services provided in this session were delivered via tele-therapy [FreeTextEntry3] : [facility]. [place] [FreeTextEntry5] : [address] [facility]

## 2022-11-15 ENCOUNTER — APPOINTMENT (OUTPATIENT)
Dept: PSYCHIATRY | Facility: CLINIC | Age: 37
End: 2022-11-15

## 2022-11-15 PROCEDURE — 90837 PSYTX W PT 60 MINUTES: CPT | Mod: 95

## 2022-11-15 NOTE — END OF VISIT
[Individual Psychotherapy for 53+ minutes] : Individual Psychotherapy for 53+ minutes  [Teletherapy Service Provided] : The services provided in this session were delivered via tele-therapy [FreeTextEntry3] : [facility]. [place] [FreeTextEntry5] : [address] [facility]
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
## 2022-11-22 ENCOUNTER — APPOINTMENT (OUTPATIENT)
Dept: PSYCHIATRY | Facility: CLINIC | Age: 37
End: 2022-11-22

## 2022-11-22 PROCEDURE — 90837 PSYTX W PT 60 MINUTES: CPT | Mod: 95

## 2022-11-23 NOTE — PLAN
[Psychoeducation] : Psychoeducation  [Supportive Therapy] : Supportive Therapy [FreeTextEntry2] : 1. To alleviate sx of depression and anxiety by utilizing coping skills \par 2. To educate pt on PTSD\par 3. To explore source of disturbances. [de-identified] : Therapist and pt had session through TEB. Pt gave consent through telehealth via teledoc. Pt was cooperative and forthcoming with information. MSE was WNL. Pt discussed what her concerns are in regards to her medication. Discussed psycho education and the support team she has at this office. Continue to  work with pt on her coping skills and managing stress. Pt reported less anxiety and apprehensiveness. Processed what the holiday season meant for her.  Denies s/i, h/i and/or plan. Denies av hallucinations.

## 2022-11-23 NOTE — END OF VISIT
[Individual Psychotherapy for 53+ minutes] : Individual Psychotherapy for 53+ minutes  [Teletherapy Service Provided] : The services provided in this session were delivered via tele-therapy [FreeTextEntry3] : [facility] [address] [facility]  [FreeTextEntry5] : [address] [facility]

## 2022-11-29 ENCOUNTER — APPOINTMENT (OUTPATIENT)
Dept: PSYCHIATRY | Facility: CLINIC | Age: 37
End: 2022-11-29

## 2022-11-29 PROCEDURE — 90837 PSYTX W PT 60 MINUTES: CPT | Mod: 95

## 2022-11-29 PROCEDURE — 99214 OFFICE O/P EST MOD 30 MIN: CPT | Mod: 95

## 2022-11-29 NOTE — END OF VISIT
[Individual Psychotherapy for 53+ minutes] : Individual Psychotherapy for 53+ minutes  [FreeTextEntry3] : [facility]. [place] [FreeTextEntry5] : [address] [facility]

## 2022-11-29 NOTE — CURRENT PSYCHIATRIC SYMPTOMS
[Depressed Mood] : depressed mood [Anhedonia] : anhedonia [Guilt] : feeling guilty [Decreased Concentration] : decreased concentrating ability [Psychomotor Agitation] : psychomotor agitation [Anorexia] : anorexia [Highly Irritable] : high irritability [Increased Activity] : increased activity [Distractibility] : distractibility [Excessive Worry] : excessive worry [Ruminations] : ruminations [Obsessions] : obsessions [Re-experiencing] : re-experiencing [Hypochondriasis] : hypochondriasis [Panic] : panic  [de-identified] : better [de-identified] : FOI. better [de-identified] : denies [de-identified] : better [de-identified] : denies [de-identified] : denies [de-identified] : denies

## 2022-11-29 NOTE — DISCUSSION/SUMMARY
[FreeTextEntry1] : Assessment: Patient is a 36 yo  female with h/o anxiety, depression and alcohol and cannabis use disorder seen today for medication management. Patient is compliant with medications and tolerating without any new reported side effects.  I-stop reviewed and no issues noticed.\par \par \par PLAN:\par Continue Lexapro 5 mg 1 week prior to period. \par Increase Lexapro 15 to 20 mg PO QD for depression and anxiety\par D/C Trazodone 50 mg PO QHS for insomnia\par D/C Zoloft 150 mg PO QAM for depression, anxiety, and PTSD\par - Discussed risks and benefits of medications including side effects of GI and sexual side effects with SSRI. Alternative strategies including no intervention discussed with patient. Patient consents to current medications as prescribed.\par - Discussed with patient regarding importance of abstinence and sobriety from alcohol and drugs. Educated about relationship between worsening mood/anxiety symptoms and drug use and improvement of symptoms with abstinence. \par - Discussed about unpredictable effects including cardiorespiratory collapse from the combination of illicit drugs and prescribed medications. Patient verbalized understanding.\par - Patient understands to contact clinic prn with concerns and agrees to call 911 or go to nearest ER if symptoms worsen.\par - Next appointment made in 1 month Patient left the office without any distress.\par
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
## 2022-11-29 NOTE — PHYSICAL EXAM
[Average] : average [Cooperative] : cooperative [Euthymic] : euthymic [Full] : full [Clear] : clear [Linear/Goal Directed] : linear/goal directed [WNL] : within normal limits [None] : none [Anxious] : anxious [Afraid] : afraid [Dysphoric] : dysphoric [Constricted] : constricted [Normal] : good [FreeTextEntry8] : "I'm depressed." better [FreeTextEntry9] : better
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
## 2022-11-29 NOTE — PLAN
[Psychoeducation] : Psychoeducation  [Supportive Therapy] : Supportive Therapy [Return in ____ week(s)] : Return in [unfilled] week(s) [FreeTextEntry2] : 1. To alleviate sx of depression and anxiety by utilizing coping skills \par 2. To educate pt on PTSD\par 3. To explore source of disturbances. [de-identified] : Therapist and pt had session through in person. Pt was cooperative and forthcoming with information. MSE was WNL. Pt discussed ongoing issues with her family. Pt and therapist went over EMDR education. Therapist confronted pt in making herself a priority. Discussed the importance of trauma work to rule out any cognitive difficulties. Plan is to give pt a month to get license so she can come in for session.  Denies s/i, h/i and/or plan. Denies av hallucinations.

## 2022-11-30 NOTE — PLAN
[FreeTextEntry2] : 1. To alleviate sx of depression and anxiety by utilizing coping skills \par 2. To educate pt on PTSD\par 3. To explore source of disturbances. [Psychoeducation] : Psychoeducation  [Supportive Therapy] : Supportive Therapy [de-identified] : Therapist and pt had session through TEB. Pt gave consent through telehealth via teledoc. Pt was cooperative and forthcoming with information. MSE was WNL. Pt discussed how she had issues with her father's children during the holidays. Therapist mirrored pts behaviors and discussed the harm when she is triggered. Therapist and pt discussed ways she can better assist with these situations and how to better emotional regulate. Denies s/i, h/i and/or plan. Denies av hallucinations.

## 2022-11-30 NOTE — END OF VISIT
[Individual Psychotherapy for 53+ minutes] : Individual Psychotherapy for 53+ minutes  [Teletherapy Service Provided] : The services provided in this session were delivered via tele-therapy [FreeTextEntry3] : Home [FreeTextEntry5] : [address] [facility]

## 2022-12-06 ENCOUNTER — APPOINTMENT (OUTPATIENT)
Dept: PSYCHIATRY | Facility: CLINIC | Age: 37
End: 2022-12-06

## 2022-12-06 PROCEDURE — 90837 PSYTX W PT 60 MINUTES: CPT | Mod: 95

## 2022-12-06 NOTE — END OF VISIT
[Individual Psychotherapy for 53+ minutes] : Individual Psychotherapy for 53+ minutes  [Teletherapy Service Provided] : The services provided in this session were delivered via tele-therapy [FreeTextEntry3] : Home [FreeTextEntry5] : [address] [facility]

## 2022-12-06 NOTE — PLAN
[FreeTextEntry2] : 1. To alleviate sx of depression and anxiety by utilizing coping skills \par 2. To educate pt on PTSD\par 3. To explore source of disturbances. [Psychoeducation] : Psychoeducation  [Supportive Therapy] : Supportive Therapy [de-identified] : Therapist and pt had session through TEB. Pt gave consent through telehealth via teledoc. Pt was cooperative and forthcoming with information. MSE was WNL. Pt discussed how she feels more at ease with medication that was given to her. Pt discussed how others have noticed a change in her. Pt stated she is not  so reactive and now much more easy to apply coping skills. Processed plans of court proceedings with ex (children's father). Denies s/i, h/i and/or plan. Denies av hallucinations.

## 2022-12-13 ENCOUNTER — APPOINTMENT (OUTPATIENT)
Dept: PSYCHIATRY | Facility: CLINIC | Age: 37
End: 2022-12-13

## 2022-12-20 ENCOUNTER — APPOINTMENT (OUTPATIENT)
Dept: PSYCHIATRY | Facility: CLINIC | Age: 37
End: 2022-12-20

## 2022-12-20 PROCEDURE — 90834 PSYTX W PT 45 MINUTES: CPT | Mod: 95

## 2022-12-20 NOTE — PLAN
[FreeTextEntry2] : 1. To alleviate sx of depression and anxiety by utilizing coping skills \par 2. To educate pt on PTSD\par 3. To explore source of disturbances. [Psychoeducation] : Psychoeducation  [Supportive Therapy] : Supportive Therapy [de-identified] : Therapist and pt had session through TEB. Pt gave consent through telehealth via teledoc. Pt was cooperative and forthcoming with information. Pt was late due to technical diffculties so therefore therapist held 40 minute session.  MSE was WNL. Pt discussed how she feels more at ease with medication that was given to her. Pt discussed how others have noticed a change in her. Pt reported that she is mediating and grounding. Discussed having a relaxing weekend. Pt reported that she did have one anxiety attack and it was work related. Discussed strategies to combat this. . Denies s/i, h/i and/or plan. Denies av hallucinations.

## 2022-12-20 NOTE — END OF VISIT
[Individual Psychotherapy for 38-52 minutes] : Individual Psychotherapy for 38 - 52 minutes [Teletherapy Service Provided] : The services provided in this session were delivered via tele-therapy [FreeTextEntry3] : Home [FreeTextEntry5] : [address] [facility]

## 2022-12-27 ENCOUNTER — APPOINTMENT (OUTPATIENT)
Dept: PSYCHIATRY | Facility: CLINIC | Age: 37
End: 2022-12-27

## 2023-01-03 ENCOUNTER — APPOINTMENT (OUTPATIENT)
Dept: PSYCHIATRY | Facility: CLINIC | Age: 38
End: 2023-01-03
Payer: COMMERCIAL

## 2023-01-03 PROCEDURE — 90837 PSYTX W PT 60 MINUTES: CPT | Mod: 95

## 2023-01-04 NOTE — END OF VISIT
[Teletherapy Service Provided] : The services provided in this session were delivered via tele-therapy [Individual Psychotherapy for 53+ minutes] : Individual Psychotherapy for 53+ minutes  [FreeTextEntry3] : Home [FreeTextEntry5] : [address] [facility]

## 2023-01-04 NOTE — PLAN
[Psychoeducation] : Psychoeducation  [Supportive Therapy] : Supportive Therapy [FreeTextEntry2] : 1. To alleviate sx of depression and anxiety by utilizing coping skills \par 2. To educate pt on PTSD\par 3. To explore source of disturbances. [de-identified] : Therapist and pt had session through TEB. Pt gave consent through telehealth via teledoc. Pt was cooperative and forthcoming with information. Pt discussed how her holidays and new years went. Pt started to discuss the state of her romantic relationship. Discussed not feeling appreciated or heard and how her trauma response is to shut down. Discussed ways to counteract this by healthy and assertive communication . Role play was initiated . Discussed the possible need for couples counseling.  Denies s/i, h/i and/or plan. Denies av hallucinations.

## 2023-01-10 ENCOUNTER — APPOINTMENT (OUTPATIENT)
Dept: PSYCHIATRY | Facility: CLINIC | Age: 38
End: 2023-01-10
Payer: COMMERCIAL

## 2023-01-10 PROCEDURE — 90837 PSYTX W PT 60 MINUTES: CPT | Mod: 95

## 2023-01-11 NOTE — PLAN
[FreeTextEntry2] : 1. To alleviate sx of depression and anxiety by utilizing coping skills \par 2. To educate pt on PTSD\par 3. To explore source of disturbances. [Psychoeducation] : Psychoeducation  [Supportive Therapy] : Supportive Therapy [de-identified] : Therapist and pt had session through TEB. Pt gave consent through telehealth via teledoc. Pt was cooperative and forthcoming with information. Pt discussed how she was able to speak to her partner in regards to how they speak to one another and how she needs help with things around the home. Pts partner was receptive. Processed and reflected on pts children and their relationship with their father. Decided to go biweekly to therapy .  Denies s/i, h/i and/or plan. Denies av hallucinations.

## 2023-01-11 NOTE — END OF VISIT
[Individual Psychotherapy for 53+ minutes] : Individual Psychotherapy for 53+ minutes  [Teletherapy Service Provided] : The services provided in this session were delivered via tele-therapy [FreeTextEntry3] : Home [FreeTextEntry5] : [address] [facility]

## 2023-01-17 ENCOUNTER — APPOINTMENT (OUTPATIENT)
Dept: PSYCHIATRY | Facility: CLINIC | Age: 38
End: 2023-01-17

## 2023-01-24 ENCOUNTER — APPOINTMENT (OUTPATIENT)
Dept: PSYCHIATRY | Facility: CLINIC | Age: 38
End: 2023-01-24
Payer: COMMERCIAL

## 2023-01-24 PROCEDURE — 90837 PSYTX W PT 60 MINUTES: CPT | Mod: 95

## 2023-01-25 ENCOUNTER — APPOINTMENT (OUTPATIENT)
Dept: PSYCHIATRY | Facility: CLINIC | Age: 38
End: 2023-01-25

## 2023-01-25 NOTE — END OF VISIT
[Individual Psychotherapy for 53+ minutes] : Individual Psychotherapy for 53+ minutes  [Teletherapy Service Provided] : The services provided in this session were delivered via tele-therapy [FreeTextEntry3] : Home [FreeTextEntry5] : [address] [facility]

## 2023-01-25 NOTE — PLAN
[FreeTextEntry2] : 1. To alleviate sx of depression and anxiety by utilizing coping skills \par 2. To educate pt on PTSD\par 3. To explore source of disturbances. [Psychoeducation] : Psychoeducation  [Supportive Therapy] : Supportive Therapy [de-identified] : Therapist and pt had session through TEB. Pt gave consent through telehealth via teledoc. Pt was cooperative and forthcoming with information. Pt discussed issues on how her ADHD ( possible as per pt report) is affecting her relationship . Discussed how pt never slows down because she forgets to do things and is filled with distractions. Discussed having better communication with partner which therapist explored with pt. Therapist continue to work with pt on setting reminder and time stamping .  Denies s/i, h/i and/or plan. Denies av hallucinations.

## 2023-01-31 ENCOUNTER — APPOINTMENT (OUTPATIENT)
Dept: PSYCHIATRY | Facility: CLINIC | Age: 38
End: 2023-01-31

## 2023-02-07 ENCOUNTER — APPOINTMENT (OUTPATIENT)
Dept: PSYCHIATRY | Facility: CLINIC | Age: 38
End: 2023-02-07
Payer: COMMERCIAL

## 2023-02-07 PROCEDURE — 90837 PSYTX W PT 60 MINUTES: CPT

## 2023-02-07 NOTE — END OF VISIT
[Individual Psychotherapy for 53+ minutes] : Individual Psychotherapy for 53+ minutes  [FreeTextEntry3] : Home [FreeTextEntry5] : [address] [facility]

## 2023-02-07 NOTE — PLAN
[FreeTextEntry2] : 1. To alleviate sx of depression and anxiety by utilizing coping skills \par 2. To educate pt on PTSD\par 3. To explore source of disturbances. [Psychoeducation] : Psychoeducation  [Supportive Therapy] : Supportive Therapy [de-identified] : Therapist and pt had session face to face. Pt had a full affect and euthymic mood. Pt was cooperative and forthcoming. Pt at times would look in other direction and have leg shaking. Pt was in office today because her daughter had her first appointment with therapist.  Discussed issues she is having with children as well as her ex. Discussed conflict with her relationship. Pt is utilizing more boundaries and is less people pleasing. Therapist continues to empower pt.   Denies s/i, h/i and/or plan. Denies av hallucinations.

## 2023-02-08 ENCOUNTER — APPOINTMENT (OUTPATIENT)
Dept: PSYCHIATRY | Facility: CLINIC | Age: 38
End: 2023-02-08
Payer: COMMERCIAL

## 2023-02-08 PROCEDURE — 99214 OFFICE O/P EST MOD 30 MIN: CPT | Mod: 95

## 2023-02-08 NOTE — CURRENT PSYCHIATRIC SYMPTOMS
[Depressed Mood] : depressed mood [Anhedonia] : anhedonia [Guilt] : feeling guilty [Decreased Concentration] : decreased concentrating ability [Psychomotor Agitation] : psychomotor agitation [Anorexia] : anorexia [Highly Irritable] : high irritability [Increased Activity] : increased activity [Distractibility] : distractibility [Excessive Worry] : excessive worry [Ruminations] : ruminations [Obsessions] : obsessions [Re-experiencing] : re-experiencing [Hypochondriasis] : hypochondriasis [Panic] : panic  [de-identified] : better [de-identified] : FOI. better [de-identified] : denies [de-identified] : better [de-identified] : denies [de-identified] : denies [de-identified] : denies

## 2023-02-08 NOTE — PLAN
[Psychoeducation] : Psychoeducation  [Supportive Therapy] : Supportive Therapy [Return in ____ week(s)] : Return in [unfilled] week(s)
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
## 2023-02-08 NOTE — FAMILY HISTORY
DME Patient Authorization Form    Name: Duke Brown  : 1958  Age: 62 y.o. Gender: female  Delivery Address: [facility]     Diagnosis:   1. Unilateral primary osteoarthritis of first carpometacarpal joint, right hand    2. Arthritis of carpometacarpal (CMC) joint of left thumb           Requested DME:  Left cool comfort splint         Clinical Notes:     **Indicates non-covered items by insurance. Payment expected on date of service. Electronically signed by  Provider __Ham Carroll MD______________________     Date: ______________________                             [facility] Tax ID # [id number]        Durable Medical Equipment and/or Orthotics Patient Consent     I understand that my physician has prescribed this medical supply as part of my treatment plan as a matter of Medical Necessity.  I understand that I have a choice in where I receive my prescribed orthopedic supplies and/or services.  I authorize [facility] to furnish this service/product and to provide my insurance carrier with any information requested in order to process for payment.  I instruct my insurance carrier to pay [facility] directly for these services/products.  I understand that my insurance carrier may deny payment for this supply because it is a non-covered item, deemed not medically necessary or considered experimental.   I understand that any cost not covered by my insurance carrier will be solely my financial responsibility.  I have received the Supplier Standards and have reviewed them.  I have received the prescribed item and have been fully instructed on the proper use of the above services/products.    ______ (Patient Initials) I understand that all DME items are non-returnable after being dispensed.  Items still in sealed packaging may be returned up to 14 days after purchasing. [address] will replace items that are defective.    ______ (Patient Initials) I understand that [facility] will not file a claim with my insurance carrier for this service/product and I am waiving my right to file a claim on my own for this service/product with my insurance company as this item is NON-COVERED (Denoted by the **) by my Insurance company/policy. ______ (Patient Initials) I understand that I am responsible to bring my equipment to the hospital for any surgery. ______________________________________________  ________________________  Patient / Sera Awad            Thank you for considering [address] Your physician has prescribed specific medical equipment or devices for your home use. The following describes your rights and responsibilities as our customer. Right to Choose Providers: You have a choice regarding which company supplies your home medical equipment and devices, and to consult your physician in this decision. You may choose a medical supply store, a home medical equipment provider, or a specialist such as [facility]. [facility] will coordinate with your physician to provide the medical equipment or devices prescribed for your home use. Right to Service:  You have the right to considerate, respectful and nondiscriminatory care. You have the right to receive accurate and easily understood information about your health care. If you speak a foreign language, or don't understand the discussions, assistance will be provided to allow you to make informed health care decisions. You have the right to know your treatment options and to participate in decisions about your care, including the right to accept or refuse treatment.   You have the right to expect a reasonable response to your requests for treatment or service. You have the right to talk in confidence with health care providers and to have your health care information protected. You have the right to receive an explanation of your bill. You have the right to complain about the service or product you receive. Patient Responsibilities:  Please provide complete and accurate information about your health insurance benefits and make arrangements for the timely payment of your bill. [facility] will, if possible, assume responsibility for billing your insurance (Medicare, Medicaid and commercial) for the prescribed equipment or devices. If your policy does not cover the prescribed product, or only covers a portion of the bill, you are responsible for any remaining balance. Return and Exchange Policy:  [facility] will honor published  Warranties for products. [facility] will accept returns or exchanges within 14 days from the date of receipt, providin) the product must be in new condition; 2) receipt as required; and 3) used disposable and hygiene products may only be returned due to a defective product. Note: Refunds will be issued in a timely manner, please allow 4-6 weeks for processing. Complaint Procedures and DME Consumer Protection Resources:  [facility] values you as a customer, and is committed to resolving patient concerns. This commitment includes understanding and documenting your concerns, conducting a review of internal procedures, and providing you with an explanation and resolution to your concerns. Should you have any questions about our services or billing process, please contact our office at (practice phone number). If we are unable to resolve the concern, you have the right to direct comments to the office of Consumer Protection, in the [address] [facility] [address] or the [facility] office, without fear of repercussion.     DMEPOS SUPPLIER STANDARDS    A supplier must be in compliance with all applicable Federal and must also cover product liability and completed operations. A supplier must agree not to initiate telephone contact with beneficiaries, with a few exceptions allowed. This standard prohibits suppliers from calling beneficiaries in order to solicit new business. A supplier is responsible for delivery and must instruct beneficiaries on use of Medicare covered items, and maintain proof of delivery. A supplier must answer questions, and respond to complaints of the beneficiaries, and maintain documentation of such contacts. A supplier must maintain and replace at no charge or repair directly, or through a service contract with another company, Medicare covered items it has rented to beneficiaries. A supplier must accept returns of substandard (less than full quality for the particular item) or unsuitable items (inappropriate for the beneficiary at the time it was fitted and rented or sold) from beneficiaries. A supplier must disclose these supplier standards to each beneficiary to whom it supplies a Medicare-covered item. A supplier must disclose to the government any person having ownership, financial, or control interest in the supplier. A supplier must not convey or reassign a supplier number; i.e., the supplier may not sell or allow another entity to use its Medicare billing number. A supplier must have a complaint resolution protocol established to address beneficiary complaints that relate to these standards. A record of these complaints must be maintained at the physical facility. Complaint records must include: the name, address, telephone number and health insurance claim number of the beneficiary, a summary of the complaint, and any action taken to resolve it. A supplier must agree to furnish CMS any information required by the Medicare statute and implementing regulations.   A supplier of DMEPOS and other items and services must be accredited by a CMS-approved accreditation organization in order [FreeTextEntry1] : Mother: undiagnosed depression and anxiety\par Father: substance use\par Mother's side: depression
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
## 2023-02-08 NOTE — END OF VISIT
[Individual Psychotherapy for 53+ minutes] : Individual Psychotherapy for 53+ minutes  [FreeTextEntry3] : [facility]. [place] [FreeTextEntry5] : [address] [facility]

## 2023-02-08 NOTE — DISCUSSION/SUMMARY
[FreeTextEntry1] : Assessment: Patient is a 36 yo  female with h/o anxiety, depression and alcohol and cannabis use disorder seen today for medication management. Patient is compliant with medications and tolerating without any new reported side effects.  I-stop reviewed and no issues noticed.\par \par \par PLAN:\par Start Wellbutrin 75 mg PO QAM for depression, low motivation, energy, concentration and decrease SSRI induced sexual dysfunction.Continue Lexapro 5 mg 1 week prior to period. \par Continue Lexapro 20 mg PO QD for depression and anxiety\par D/C Trazodone 50 mg PO QHS for insomnia\par D/C Zoloft 150 mg PO QAM for depression, anxiety, and PTSD\par - Discussed risks and benefits of medications including side effects of GI and sexual side effects with SSRI. Alternative strategies including no intervention discussed with patient. Patient consents to current medications as prescribed.\par - Discussed with patient regarding importance of abstinence and sobriety from alcohol and drugs. Educated about relationship between worsening mood/anxiety symptoms and drug use and improvement of symptoms with abstinence. \par - Discussed about unpredictable effects including cardiorespiratory collapse from the combination of illicit drugs and prescribed medications. Patient verbalized understanding.\par - Patient understands to contact clinic prn with concerns and agrees to call 911 or go to nearest ER if symptoms worsen.\par - Next appointment made in 1 month Patient left the office without any distress.\par

## 2023-02-21 ENCOUNTER — APPOINTMENT (OUTPATIENT)
Dept: PSYCHIATRY | Facility: CLINIC | Age: 38
End: 2023-02-21
Payer: COMMERCIAL

## 2023-02-21 PROCEDURE — 90837 PSYTX W PT 60 MINUTES: CPT | Mod: 95
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
## 2023-02-22 NOTE — REASON FOR VISIT
[Patient] : Patient [Access issues (e.g., transportation, impaired mobility, etc.)] : due to patient's access issues [Telehealth (audio & video) - Individual/Group] : This visit was provided via telehealth using real-time 2-way audio visual technology. [Medical Office: ([facility])___] : The provider was located at the medical office in [unfilled]. [Home] : The patient, [unfilled], was located at home, [unfilled], at the time of the visit. [FreeTextEntry4] : 8831 [FreeTextEntry5] : 8806 [FreeTextEntry2] : [birth year] [FreeTextEntry1] : PTSD

## 2023-02-22 NOTE — END OF VISIT
[Individual Psychotherapy for 53+ minutes] : Individual Psychotherapy for 53+ minutes  [FreeTextEntry3] : Home [FreeTextEntry5] : [address] [facility]

## 2023-03-02 NOTE — PLAN
Ronna Talamantes                   YOB: Marina [facility] - [phone]    Meds-to-Beds request:        [facility] #[id number] Phillip Mejia, [address] [address]  Phone: [phone] Fax: [phone]      [phone] Fax: [phone] [FreeTextEntry2] : 1. To alleviate sx of depression and anxiety by utilizing coping skills \par 2. To educate pt on PTSD\par 3. To explore source of disturbances. [Psychoeducation] : Psychoeducation  [Supportive Therapy] : Supportive Therapy [de-identified] : Therapist and pt had session through in person. Pt was cooperative and forthcoming with information. MSE was WNL. Pt discussed ongoing issues with her family. Pt and therapist went over EMDR education. Therapist confronted pt in making herself a priority. Discussed the importance of trauma work to rule out any cognitive difficulties. Plan is to give pt a month to get license so she can come in for session.  Denies s/i, h/i and/or plan. Denies av hallucinations.  [Return in ____ week(s)] : Return in [unfilled] week(s)

## 2023-03-14 ENCOUNTER — APPOINTMENT (OUTPATIENT)
Dept: PSYCHIATRY | Facility: CLINIC | Age: 38
End: 2023-03-14
Payer: SELF-PAY

## 2023-03-14 PROCEDURE — 90837 PSYTX W PT 60 MINUTES: CPT | Mod: 95

## 2023-03-15 NOTE — REASON FOR VISIT
[Access issues (e.g., transportation, impaired mobility, etc.)] : due to patient's access issues [Telehealth (audio & video) - Individual/Group] : This visit was provided via telehealth using real-time 2-way audio visual technology. [Medical Office: ([facility])___] : The provider was located at the medical office in [unfilled]. [Home] : The patient, [unfilled], was located at home, [unfilled], at the time of the visit. [FreeTextEntry4] : 2482 [FreeTextEntry5] : 0269 [FreeTextEntry2] : [birth year] [Patient] : Patient [FreeTextEntry1] : PTSD

## 2023-03-15 NOTE — PLAN
[FreeTextEntry2] : 1. To alleviate sx of depression and anxiety by utilizing coping skills \par 2. To educate pt on PTSD\par 3. To explore source of disturbances. [Psychoeducation] : Psychoeducation  [Supportive Therapy] : Supportive Therapy [de-identified] : Therapist and pt had session through [facility]. Pt was forthcoming with information. Discussed how she is attempting opposite action when it comes to having arguments with her boyfriend. Therapist and pt discussed how she continues to lose her concentration and keeping track of things,. Pt sometimes forgets appointments. THerapist discussed the importance of slowing down. Discussed mindfulness walking.  Denies s/i, h/i and/or plan. Denies av hallucinations.

## 2023-03-15 NOTE — END OF VISIT
[Individual Psychotherapy for 53+ minutes] : Individual Psychotherapy for 53+ minutes  [FreeTextEntry3] : Home [FreeTextEntry5] : [address] [facility]

## 2023-03-28 ENCOUNTER — APPOINTMENT (OUTPATIENT)
Dept: PSYCHIATRY | Facility: CLINIC | Age: 38
End: 2023-03-28

## 2023-04-11 ENCOUNTER — APPOINTMENT (OUTPATIENT)
Dept: PSYCHIATRY | Facility: CLINIC | Age: 38
End: 2023-04-11
Payer: SELF-PAY

## 2023-04-11 PROCEDURE — 90837 PSYTX W PT 60 MINUTES: CPT | Mod: 95
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
## 2023-04-12 NOTE — END OF VISIT
[Individual Psychotherapy for 53+ minutes] : Individual Psychotherapy for 53+ minutes  [FreeTextEntry3] : Home [FreeTextEntry5] : [address] [facility]

## 2023-04-12 NOTE — REASON FOR VISIT
[Access issues (e.g., transportation, impaired mobility, etc.)] : due to patient's access issues [Telehealth (audio & video) - Individual/Group] : This visit was provided via telehealth using real-time 2-way audio visual technology. [Medical Office: ([facility])___] : The provider was located at the medical office in [unfilled]. [Home] : The patient, [unfilled], was located at home, [unfilled], at the time of the visit. [FreeTextEntry4] : 3953 [FreeTextEntry5] : 8978 [FreeTextEntry2] : [birth year] [Patient] : Patient [FreeTextEntry1] : PTSD

## 2023-04-27 ENCOUNTER — APPOINTMENT (OUTPATIENT)
Dept: PSYCHIATRY | Facility: CLINIC | Age: 38
End: 2023-04-27

## 2023-05-02 ENCOUNTER — APPOINTMENT (OUTPATIENT)
Dept: PSYCHIATRY | Facility: CLINIC | Age: 38
End: 2023-05-02
Payer: COMMERCIAL

## 2023-05-02 PROCEDURE — 90837 PSYTX W PT 60 MINUTES: CPT

## 2023-05-03 ENCOUNTER — APPOINTMENT (OUTPATIENT)
Dept: PSYCHIATRY | Facility: CLINIC | Age: 38
End: 2023-05-03
Payer: SELF-PAY

## 2023-05-03 PROCEDURE — 99214 OFFICE O/P EST MOD 30 MIN: CPT | Mod: 95
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
## 2023-05-03 NOTE — DISCUSSION/SUMMARY
[FreeTextEntry1] : Assessment: Patient is a 36 yo  female with h/o anxiety, depression and alcohol and cannabis use disorder seen today for medication management. Patient is compliant with medications and tolerating without any new reported side effects.  I-stop reviewed and no issues noticed.\par \par \par PLAN:\par Increase  Wellbutrin 75 to  mg PO QAM for depression, low motivation, energy, concentration and decrease SSRI induced sexual dysfunction.\par Continue Lexapro 5 mg 1 week prior to period. \par Continue Lexapro 20 mg PO QD for depression and anxiety\par D/C Trazodone 50 mg PO QHS for insomnia\par D/C Zoloft 150 mg PO QAM for depression, anxiety, and PTSD\par - Discussed risks and benefits of medications including side effects of GI and sexual side effects with SSRI. Alternative strategies including no intervention discussed with patient. Patient consents to current medications as prescribed.\par - Discussed with patient regarding importance of abstinence and sobriety from alcohol and drugs. Educated about relationship between worsening mood/anxiety symptoms and drug use and improvement of symptoms with abstinence. \par - Discussed about unpredictable effects including cardiorespiratory collapse from the combination of illicit drugs and prescribed medications. Patient verbalized understanding.\par - Patient understands to contact clinic prn with concerns and agrees to call 911 or go to nearest ER if symptoms worsen.\par - Next appointment made in 1 month Patient left the office without any distress.\par

## 2023-05-03 NOTE — CURRENT PSYCHIATRIC SYMPTOMS
[Depressed Mood] : depressed mood [Anhedonia] : anhedonia [Guilt] : feeling guilty [Decreased Concentration] : decreased concentrating ability [Psychomotor Agitation] : psychomotor agitation [Anorexia] : anorexia [Highly Irritable] : high irritability [Increased Activity] : increased activity [Distractibility] : distractibility [Excessive Worry] : excessive worry [Ruminations] : ruminations [Obsessions] : obsessions [Re-experiencing] : re-experiencing [Hypochondriasis] : hypochondriasis [Panic] : panic  [de-identified] : better [de-identified] : FOI. better [de-identified] : denies [de-identified] : better [de-identified] : denies [de-identified] : denies [de-identified] : denies
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
## 2023-05-03 NOTE — END OF VISIT
[Individual Psychotherapy for 53+ minutes] : Individual Psychotherapy for 53+ minutes  [FreeTextEntry3] : [facility]. [place] [FreeTextEntry5] : [address] [facility]
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
## 2023-05-03 NOTE — END OF VISIT
[Individual Psychotherapy for 53+ minutes] : Individual Psychotherapy for 53+ minutes  [FreeTextEntry3] : Home [FreeTextEntry5] : [address] [facility]

## 2023-05-16 ENCOUNTER — APPOINTMENT (OUTPATIENT)
Dept: PSYCHIATRY | Facility: CLINIC | Age: 38
End: 2023-05-16
Payer: COMMERCIAL

## 2023-05-16 PROCEDURE — 90837 PSYTX W PT 60 MINUTES: CPT

## 2023-05-17 NOTE — END OF VISIT
[Individual Psychotherapy for 53+ minutes] : Individual Psychotherapy for 53+ minutes  [FreeTextEntry3] : Home [FreeTextEntry5] : [address] [facility]

## 2023-05-17 NOTE — PLAN
[FreeTextEntry2] : 1. To alleviate sx of depression and anxiety by utilizing coping skills \par 2. To educate pt on PTSD\par 3. To explore source of disturbances. [Eye Movement Desensitization and Reprocessing Therapy (EMDR)] : Eye Movement Desensitization and Reprocessing Therapy (EMDR) [Psychoeducation] : Psychoeducation  [Supportive Therapy] : Supportive Therapy [de-identified] : Therapist and pt had session face to face in office. Pt came in with a full affect and euthymic mood. Pt and therapist discussed ongoing issues she is having at home with her partner. Reported that she has had increase anxiety over the past two weeks. Further discussed conflict between the couple and dynamics . Went over assertive communication and healthy boundaries.  Denies s/i, h/i and/or plan. Denies av hallucinations.

## 2023-05-30 ENCOUNTER — NON-APPOINTMENT (OUTPATIENT)
Age: 38
End: 2023-05-30

## 2023-05-30 ENCOUNTER — APPOINTMENT (OUTPATIENT)
Dept: PSYCHIATRY | Facility: CLINIC | Age: 38
End: 2023-05-30

## 2023-06-05 ENCOUNTER — APPOINTMENT (OUTPATIENT)
Dept: PSYCHIATRY | Facility: CLINIC | Age: 38
End: 2023-06-05
Payer: SELF-PAY

## 2023-06-05 PROCEDURE — 99214 OFFICE O/P EST MOD 30 MIN: CPT | Mod: 95

## 2023-06-05 RX ORDER — TRAZODONE HYDROCHLORIDE 50 MG/1
50 TABLET ORAL
Qty: 30 | Refills: 0 | Status: COMPLETED | COMMUNITY
Start: 2021-04-02 | End: 2023-06-05

## 2023-06-05 RX ORDER — BUPROPION HYDROCHLORIDE 75 MG/1
75 TABLET, FILM COATED ORAL
Qty: 30 | Refills: 0 | Status: COMPLETED | COMMUNITY
Start: 2023-02-08 | End: 2023-06-05

## 2023-06-05 NOTE — CURRENT PSYCHIATRIC SYMPTOMS
[Depressed Mood] : depressed mood [Anhedonia] : anhedonia [Guilt] : feeling guilty [Decreased Concentration] : decreased concentrating ability [Psychomotor Agitation] : psychomotor agitation [Anorexia] : anorexia [Highly Irritable] : high irritability [Increased Activity] : increased activity [Distractibility] : distractibility [Excessive Worry] : excessive worry [Ruminations] : ruminations [Obsessions] : obsessions [Re-experiencing] : re-experiencing [Hypochondriasis] : hypochondriasis [Panic] : panic  [de-identified] : better [de-identified] : FOI. better [de-identified] : denies [de-identified] : better [de-identified] : denies [de-identified] : denies [de-identified] : denies
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
## 2023-06-13 ENCOUNTER — APPOINTMENT (OUTPATIENT)
Dept: PSYCHIATRY | Facility: CLINIC | Age: 38
End: 2023-06-13
Payer: COMMERCIAL

## 2023-06-13 PROCEDURE — 90837 PSYTX W PT 60 MINUTES: CPT

## 2023-06-14 NOTE — END OF VISIT
[Individual Psychotherapy for 53+ minutes] : Individual Psychotherapy for 53+ minutes  [FreeTextEntry3] : Home [FreeTextEntry5] : [address] [facility]

## 2023-06-14 NOTE — PLAN
[FreeTextEntry2] : 1. To alleviate sx of depression and anxiety by utilizing coping skills \par 2. To educate pt on PTSD\par 3. To explore source of disturbances. [Eye Movement Desensitization and Reprocessing Therapy (EMDR)] : Eye Movement Desensitization and Reprocessing Therapy (EMDR) [Psychoeducation] : Psychoeducation  [Supportive Therapy] : Supportive Therapy [de-identified] : Therapist and pt had session face to face in office. Pt came in with a full affect and euthymic mood. Pt and therapist discussed how she had less anxiety. Pt reported positive side effects with meds. Pt reported that she is happier and isn't so focused on negative things. Pt and therapist processed some issues at home and processed they dynamics. Pt stated she is excelling at work and has a potential job promotion. Pt still wants to learn to drive in order to get to sessions alone. Denies s/i, h/i and/or plan. Denies av hallucinations.

## 2023-07-11 ENCOUNTER — APPOINTMENT (OUTPATIENT)
Dept: PSYCHIATRY | Facility: CLINIC | Age: 38
End: 2023-07-11
Payer: COMMERCIAL

## 2023-07-11 PROCEDURE — 90837 PSYTX W PT 60 MINUTES: CPT | Mod: 95

## 2023-07-12 NOTE — PLAN
[FreeTextEntry2] : 1. To alleviate sx of depression and anxiety by utilizing coping skills \par 2. To educate pt on PTSD\par 3. To explore source of disturbances. [Eye Movement Desensitization and Reprocessing Therapy (EMDR)] : Eye Movement Desensitization and Reprocessing Therapy (EMDR) [Psychoeducation] : Psychoeducation  [Supportive Therapy] : Supportive Therapy [de-identified] : Therapist and pt had session face to face in office. Pt came in with a full affect and euthymic mood. Pt and therapist discussed how she had less anxiety. Pt reported positive side effects with meds. Pt reported that she continues to have conflict with her partner. DIscussed how to ease her anxiety about upcoming road test through visualizations. Ended with a meditation .  Denies s/i, h/i and/or plan. Denies av hallucinations.

## 2023-07-12 NOTE — END OF VISIT
[Individual Psychotherapy for 53+ minutes] : Individual Psychotherapy for 53+ minutes  [Teletherapy Service Provided] : The services provided in this session were delivered via tele-therapy [FreeTextEntry3] : Home [FreeTextEntry5] : [address] [facility]

## 2023-07-12 NOTE — REASON FOR VISIT
[Patient preference] : as per patient preference [Telehealth (audio & video) - Individual/Group] : This visit was provided via telehealth using real-time 2-way audio visual technology. [Medical Office: ([facility])___] : The provider was located at the medical office in [unfilled]. [Home] : The patient, [unfilled], was located at home, [unfilled], at the time of the visit. [FreeTextEntry4] : 3872 [FreeTextEntry5] : 0360 [Patient] : Patient [FreeTextEntry1] : PTSD

## 2023-07-18 ENCOUNTER — APPOINTMENT (OUTPATIENT)
Dept: PSYCHIATRY | Facility: CLINIC | Age: 38
End: 2023-07-18
Payer: COMMERCIAL

## 2023-07-18 PROCEDURE — 90837 PSYTX W PT 60 MINUTES: CPT | Mod: 95

## 2023-07-19 NOTE — REASON FOR VISIT
[Patient preference] : as per patient preference [Telehealth (audio & video) - Individual/Group] : This visit was provided via telehealth using real-time 2-way audio visual technology. [Medical Office: ([facility])___] : The provider was located at the medical office in [unfilled]. [Home] : The patient, [unfilled], was located at home, [unfilled], at the time of the visit. [FreeTextEntry4] : 2349 [FreeTextEntry5] : 5219 [Patient] : Patient [FreeTextEntry1] : PTSD

## 2023-07-19 NOTE — END OF VISIT
[Individual Psychotherapy for 53+ minutes] : Individual Psychotherapy for 53+ minutes  [Teletherapy Service Provided] : The services provided in this session were delivered via tele-therapy [FreeTextEntry3] : Home [FreeTextEntry5] : [address] [facility]

## 2023-07-19 NOTE — PLAN
[FreeTextEntry2] : 1. To alleviate sx of depression and anxiety by utilizing coping skills \par 2. To educate pt on PTSD\par 3. To explore source of disturbances. [Eye Movement Desensitization and Reprocessing Therapy (EMDR)] : Eye Movement Desensitization and Reprocessing Therapy (EMDR) [Psychoeducation] : Psychoeducation  [Supportive Therapy] : Supportive Therapy [de-identified] : Therapist and pt had session face to face in office. Pt came in with a full affect and euthymic mood. Pt and therapist discussed how her main stresor has been driving. DIscussed how she gets scared of other drivers on the road. Discuss exposure therapy. Therapist played driving videos during session while she imagined herself driving. Pt reported increase of anxiety. Will discuss with psychiatrist .  Denies s/i, h/i and/or plan. Denies av hallucinations.

## 2023-08-01 ENCOUNTER — APPOINTMENT (OUTPATIENT)
Dept: PSYCHIATRY | Facility: CLINIC | Age: 38
End: 2023-08-01
Payer: COMMERCIAL

## 2023-08-01 PROCEDURE — 90837 PSYTX W PT 60 MINUTES: CPT | Mod: 95

## 2023-08-02 NOTE — REASON FOR VISIT
[Patient preference] : as per patient preference [Telehealth (audio & video) - Individual/Group] : This visit was provided via telehealth using real-time 2-way audio visual technology. [Medical Office: ([facility])___] : The provider was located at the medical office in [unfilled]. [Home] : The patient, [unfilled], was located at home, [unfilled], at the time of the visit. [FreeTextEntry4] : 8000 [FreeTextEntry5] : 5801 [Patient] : Patient [FreeTextEntry1] : PTSD

## 2023-08-02 NOTE — PLAN
[FreeTextEntry2] : 1. To alleviate sx of depression and anxiety by utilizing coping skills \par  2. To educate pt on PTSD\par  3. To explore source of disturbances. [Eye Movement Desensitization and Reprocessing Therapy (EMDR)] : Eye Movement Desensitization and Reprocessing Therapy (EMDR) [Psychoeducation] : Psychoeducation  [Supportive Therapy] : Supportive Therapy [de-identified] : Therapist and pt had session face to face in office. Pt came in with a full affect and euthymic mood. Pt and therapist discussed how her main stressor has been her relationship with her long-term partner. Discussed how she has been utilizing more effective communication and having a straighter forward approach. Continue to work with pt on her grounding skills and prioritizing her needs.  Pt reports more focus and less anxiety this week.   Denies s/i, h/i and/or plan. Denies av hallucinations.

## 2023-08-02 NOTE — END OF VISIT
[Individual Psychotherapy for 53+ minutes] : Individual Psychotherapy for 53+ minutes  [Teletherapy Service Provided] : The services provided in this session were delivered via tele-therapy [FreeTextEntry3] : Home [FreeTextEntry5] : [address] [facility]

## 2023-08-22 ENCOUNTER — APPOINTMENT (OUTPATIENT)
Dept: PSYCHIATRY | Facility: CLINIC | Age: 38
End: 2023-08-22
Payer: COMMERCIAL

## 2023-08-22 PROCEDURE — 90837 PSYTX W PT 60 MINUTES: CPT | Mod: 95

## 2023-08-22 NOTE — PLAN
[FreeTextEntry2] : 1. To alleviate sx of depression and anxiety by utilizing coping skills \par  2. To educate pt on PTSD\par  3. To explore source of disturbances. [Eye Movement Desensitization and Reprocessing Therapy (EMDR)] : Eye Movement Desensitization and Reprocessing Therapy (EMDR) [Psychoeducation] : Psychoeducation  [Supportive Therapy] : Supportive Therapy [de-identified] : Therapist and pt had session face to face in office. Pt came in with a full affect and euthymic mood. Pt and therapist discussed how her main stressor has been her dog sitting brother's dog.  Pt discussed a difference in opinion when dog sitting and how that's affected her. Discussed having less anxiety and feels like meds are leveling out. Therapist led pt in SE and mindfulness medfitation. Denies s/i, h/i and/or plan. Denies av hallucinations.

## 2023-08-22 NOTE — REASON FOR VISIT
[Patient preference] : as per patient preference [Telehealth (audio & video) - Individual/Group] : This visit was provided via telehealth using real-time 2-way audio visual technology. [Medical Office: ([facility])___] : The provider was located at the medical office in [unfilled]. [Home] : The patient, [unfilled], was located at home, [unfilled], at the time of the visit. [FreeTextEntry4] : 6043 [FreeTextEntry5] : 3541 [Patient] : Patient [FreeTextEntry1] : PTSD

## 2023-08-22 NOTE — END OF VISIT
[Individual Psychotherapy for 53+ minutes] : Individual Psychotherapy for 53+ minutes  [Teletherapy Service Provided] : The services provided in this session were delivered via tele-therapy [FreeTextEntry3] : Home [FreeTextEntry5] : [address] [facility]

## 2023-09-05 ENCOUNTER — APPOINTMENT (OUTPATIENT)
Dept: PSYCHIATRY | Facility: CLINIC | Age: 38
End: 2023-09-05
Payer: COMMERCIAL

## 2023-09-05 PROCEDURE — 90837 PSYTX W PT 60 MINUTES: CPT | Mod: 95

## 2023-09-06 NOTE — PLAN
[Psychoeducation] : Psychoeducation  [Supportive Therapy] : Supportive Therapy [FreeTextEntry2] : 1. To alleviate sx of depression and anxiety by utilizing coping skills \par  2. To educate pt on PTSD\par  3. To explore source of disturbances. [de-identified] : Therapist and pt had session face to face in office. Pt came in with a full affect and euthymic mood. Pt and therapist discussed how she has been more leveled and grounded. Discussed ongoing issues regarding work and time management. Therapist discussed and reflected how pt utilizes her coping skills.  Discussed possibly moving pt to another time slot. Pt still remains stagnant on learning how to drive and doing EMDR. Pt does recognize this and therapist acknowledged her strengths.  Denies s/i, h/i and/or plan. Denies av hallucinations.

## 2023-09-06 NOTE — END OF VISIT
[Individual Psychotherapy for 53+ minutes] : Individual Psychotherapy for 53+ minutes  [Teletherapy Service Provided] : The services provided in this session were delivered via tele-therapy [FreeTextEntry3] : Home [FreeTextEntry5] : [address] [facility]

## 2023-09-06 NOTE — REASON FOR VISIT
[Patient preference] : as per patient preference [Telehealth (audio & video) - Individual/Group] : This visit was provided via telehealth using real-time 2-way audio visual technology. [Medical Office: ([facility])___] : The provider was located at the medical office in [unfilled]. [Home] : The patient, [unfilled], was located at home, [unfilled], at the time of the visit. [Patient] : Patient [FreeTextEntry4] : 0751 [FreeTextEntry5] : 7974 [FreeTextEntry1] : PTSD

## 2023-09-27 ENCOUNTER — APPOINTMENT (OUTPATIENT)
Dept: PSYCHIATRY | Facility: CLINIC | Age: 38
End: 2023-09-27
Payer: COMMERCIAL

## 2023-09-27 PROCEDURE — 90837 PSYTX W PT 60 MINUTES: CPT | Mod: GT

## 2023-10-10 ENCOUNTER — APPOINTMENT (OUTPATIENT)
Dept: PSYCHIATRY | Facility: CLINIC | Age: 38
End: 2023-10-10
Payer: COMMERCIAL

## 2023-10-10 PROCEDURE — 90837 PSYTX W PT 60 MINUTES: CPT | Mod: GT

## 2023-10-17 ENCOUNTER — APPOINTMENT (OUTPATIENT)
Dept: PSYCHIATRY | Facility: CLINIC | Age: 38
End: 2023-10-17

## 2023-10-24 ENCOUNTER — APPOINTMENT (OUTPATIENT)
Dept: PSYCHIATRY | Facility: CLINIC | Age: 38
End: 2023-10-24
Payer: COMMERCIAL

## 2023-10-24 PROCEDURE — 90837 PSYTX W PT 60 MINUTES: CPT | Mod: GT

## 2023-10-27 ENCOUNTER — APPOINTMENT (OUTPATIENT)
Dept: PSYCHIATRY | Facility: CLINIC | Age: 38
End: 2023-10-27
Payer: COMMERCIAL

## 2023-10-27 PROCEDURE — 99214 OFFICE O/P EST MOD 30 MIN: CPT | Mod: 95

## 2023-10-31 ENCOUNTER — APPOINTMENT (OUTPATIENT)
Dept: PSYCHIATRY | Facility: CLINIC | Age: 38
End: 2023-10-31
Payer: COMMERCIAL

## 2023-10-31 PROCEDURE — 90837 PSYTX W PT 60 MINUTES: CPT | Mod: GT

## 2023-11-07 ENCOUNTER — APPOINTMENT (OUTPATIENT)
Dept: PSYCHIATRY | Facility: CLINIC | Age: 38
End: 2023-11-07
Payer: COMMERCIAL

## 2023-11-07 PROCEDURE — 90837 PSYTX W PT 60 MINUTES: CPT | Mod: GT

## 2023-11-14 ENCOUNTER — APPOINTMENT (OUTPATIENT)
Dept: PSYCHIATRY | Facility: CLINIC | Age: 38
End: 2023-11-14

## 2023-11-21 ENCOUNTER — APPOINTMENT (OUTPATIENT)
Dept: PSYCHIATRY | Facility: CLINIC | Age: 38
End: 2023-11-21
Payer: COMMERCIAL

## 2023-11-21 PROCEDURE — 90837 PSYTX W PT 60 MINUTES: CPT

## 2023-11-28 ENCOUNTER — APPOINTMENT (OUTPATIENT)
Dept: PSYCHIATRY | Facility: CLINIC | Age: 38
End: 2023-11-28
Payer: COMMERCIAL

## 2023-11-28 PROCEDURE — 90837 PSYTX W PT 60 MINUTES: CPT | Mod: GT

## 2023-12-05 ENCOUNTER — APPOINTMENT (OUTPATIENT)
Dept: PSYCHIATRY | Facility: CLINIC | Age: 38
End: 2023-12-05
Payer: COMMERCIAL

## 2023-12-05 PROCEDURE — 90837 PSYTX W PT 60 MINUTES: CPT | Mod: GT

## 2023-12-19 ENCOUNTER — APPOINTMENT (OUTPATIENT)
Dept: PSYCHIATRY | Facility: CLINIC | Age: 38
End: 2023-12-19

## 2024-01-02 ENCOUNTER — APPOINTMENT (OUTPATIENT)
Dept: PSYCHIATRY | Facility: CLINIC | Age: 39
End: 2024-01-02
Payer: COMMERCIAL

## 2024-01-02 PROCEDURE — 90837 PSYTX W PT 60 MINUTES: CPT | Mod: GT

## 2024-01-03 NOTE — END OF VISIT
[Individual Psychotherapy for 53+ minutes] : Individual Psychotherapy for 53+ minutes  [Teletherapy Service Provided] : The services provided in this session were delivered via tele-therapy [FreeTextEntry3] : Home [FreeTextEntry5] : [address] [facility]
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
## 2024-01-09 ENCOUNTER — APPOINTMENT (OUTPATIENT)
Dept: PSYCHIATRY | Facility: CLINIC | Age: 39
End: 2024-01-09
Payer: COMMERCIAL

## 2024-01-09 PROCEDURE — 90837 PSYTX W PT 60 MINUTES: CPT | Mod: GT

## 2024-01-09 NOTE — END OF VISIT
[Individual Psychotherapy for 53+ minutes] : Individual Psychotherapy for 53+ minutes  [Teletherapy Service Provided] : The services provided in this session were delivered via tele-therapy [FreeTextEntry3] : Home [FreeTextEntry5] : [address] [facility]

## 2024-01-09 NOTE — REASON FOR VISIT
[Patient preference] : as per patient preference [Telehealth (audio & video) - Individual/Group] : This visit was provided via telehealth using real-time 2-way audio visual technology. [Medical Office: ([facility])___] : The provider was located at the medical office in [unfilled]. [Home] : The patient, [unfilled], was located at home, [unfilled], at the time of the visit. [Patient] : Patient [FreeTextEntry4] : 9457 [FreeTextEntry8] : 9186 [FreeTextEntry1] : PTSD

## 2024-01-09 NOTE — RISK ASSESSMENT
[No, patient denies ideation or behavior] : No, patient denies ideation or behavior
Simple: Patient demonstrates quick and easy understanding/Verbalized Understanding
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
## 2024-01-09 NOTE — PHYSICAL EXAM
depression ([address])     managed with medications; Followed by Dr. Josue Marquez, Psych    Diminished pulses in lower extremity 1/27/2016    Eczema     now resolved    Hearing loss 2014    Hypercholesterolemia     Iliac artery stenosis, right ([address])     followed by Dr Urszula Son (vascular)    Leg pain     Smoker     1/2 ppd       Vitals:  Vitals:    08/01/23 1030   BP: (!) 112/58   Pulse: 68   SpO2: 97%       ROS:  Psychological ROS: negative for anxiety, depression, and mood swings    Mental Status Exam:   Appearance  Appears stated age, Well-kept, Appropriately attired, Well-nourished, Cooperative, and Maintains eye-contact  Behavior  Cooperative and Pleasant  Psychomotor Activity within normal limits  Gait and Station Normal Wendi and Station and Normal Balance  Speech   appropriate  Mood    is euthymic  Affect    calm  Thought Process Goal-Directed and Circumstantial  Thought Content/Perceptual Disturbances free of delusions, free of hallucinations, and not internally preoccupied  Cognition/Sensorium Alert, Fully oriented, Normal concentration/ Attention, Recent memory intact, Remote memory intact, and Fund of knowledge adequate for level of education  Insight  good  Judgment good  Assessment:    Adin Duggan was seen today for follow-up. Diagnoses and all orders for this visit:    Bipolar disorder with depression ([address])    ABILIO (generalized anxiety disorder)    Other orders  -     PARoxetine (PAXIL) 20 MG tablet; Take 1 tablet by mouth every morning  -     trihexyphenidyl (ARTANE) 2 MG tablet; Take 1 tablet by mouth 2 times daily  -     lithium 300 MG capsule; Take 2 capsules by mouth 2 times daily (with meals)         Patient Education and Counseling:  Supportive therapy provided for identified psychosocial stressors. Medication education provided and decisions regarding medication regimen discussed with patient. Plan:  -  Continue paroxetine 20 mg daily, Lithium 600 mg bid  and Artane 2 mg bid.   -  Crisis plan [Average] : average [Cooperative] : cooperative [Euthymic] : euthymic [Full] : full [Clear] : clear [Linear/Goal Directed] : linear/goal directed [WNL] : within normal limits

## 2024-01-16 ENCOUNTER — APPOINTMENT (OUTPATIENT)
Dept: PSYCHIATRY | Facility: CLINIC | Age: 39
End: 2024-01-16
Payer: COMMERCIAL

## 2024-01-16 PROCEDURE — 90837 PSYTX W PT 60 MINUTES: CPT | Mod: GT

## 2024-01-17 NOTE — REASON FOR VISIT
[Patient preference] : as per patient preference [Telehealth (audio & video) - Individual/Group] : This visit was provided via telehealth using real-time 2-way audio visual technology. [Medical Office: ([facility])___] : The provider was located at the medical office in [unfilled]. [Home] : The patient, [unfilled], was located at home, [unfilled], at the time of the visit. [Patient] : Patient [FreeTextEntry4] : 5462 [FreeTextEntry6] : 4995 [FreeTextEntry1] : PTSD

## 2024-01-17 NOTE — END OF VISIT
[Individual Psychotherapy for 53+ minutes] : Individual Psychotherapy for 53+ minutes  [Teletherapy Service Provided] : The services provided in this session were delivered via tele-therapy [FreeTextEntry3] : Home [FreeTextEntry5] : [address] [facility]
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
## 2024-01-23 ENCOUNTER — APPOINTMENT (OUTPATIENT)
Dept: PSYCHIATRY | Facility: CLINIC | Age: 39
End: 2024-01-23

## 2024-01-30 ENCOUNTER — APPOINTMENT (OUTPATIENT)
Dept: PSYCHIATRY | Facility: CLINIC | Age: 39
End: 2024-01-30
Payer: COMMERCIAL

## 2024-01-30 PROCEDURE — 90837 PSYTX W PT 60 MINUTES: CPT | Mod: GT

## 2024-01-30 NOTE — END OF VISIT
[Individual Psychotherapy for 53+ minutes] : Individual Psychotherapy for 53+ minutes  [Teletherapy Service Provided] : The services provided in this session were delivered via tele-therapy [FreeTextEntry3] : Home [FreeTextEntry5] : [address] [facility]
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
## 2024-01-30 NOTE — REASON FOR VISIT
[Patient preference] : as per patient preference [Telehealth (audio & video) - Individual/Group] : This visit was provided via telehealth using real-time 2-way audio visual technology. [Medical Office: ([facility])___] : The provider was located at the medical office in [unfilled]. [Home] : The patient, [unfilled], was located at home, [unfilled], at the time of the visit. [FreeTextEntry4] : 5387 [FreeTextEntry2] : 5830 [Patient] : Patient [FreeTextEntry1] : PTSD

## 2024-02-06 ENCOUNTER — APPOINTMENT (OUTPATIENT)
Dept: PSYCHIATRY | Facility: CLINIC | Age: 39
End: 2024-02-06
Payer: COMMERCIAL

## 2024-02-06 PROCEDURE — 90837 PSYTX W PT 60 MINUTES: CPT | Mod: GT

## 2024-02-06 NOTE — END OF VISIT
[Individual Psychotherapy for 53+ minutes] : Individual Psychotherapy for 53+ minutes  [Teletherapy Service Provided] : The services provided in this session were delivered via tele-therapy [FreeTextEntry3] : Home [FreeTextEntry5] : [address] [facility]
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
## 2024-02-06 NOTE — REASON FOR VISIT
[Patient preference] : as per patient preference [Telehealth (audio & video) - Individual/Group] : This visit was provided via telehealth using real-time 2-way audio visual technology. [Medical Office: ([facility])___] : The provider was located at the medical office in [unfilled]. [Home] : The patient, [unfilled], was located at home, [unfilled], at the time of the visit. [FreeTextEntry4] : 7430 [FreeTextEntry8] : 4351 [Patient] : Patient [FreeTextEntry1] : PTSD

## 2024-02-13 ENCOUNTER — APPOINTMENT (OUTPATIENT)
Dept: PSYCHIATRY | Facility: CLINIC | Age: 39
End: 2024-02-13

## 2024-02-13 ENCOUNTER — APPOINTMENT (OUTPATIENT)
Dept: PSYCHIATRY | Facility: CLINIC | Age: 39
End: 2024-02-13
Payer: COMMERCIAL

## 2024-02-13 PROCEDURE — 90837 PSYTX W PT 60 MINUTES: CPT | Mod: 95

## 2024-02-14 NOTE — REASON FOR VISIT
[Patient preference] : as per patient preference [Telehealth (audio & video) - Individual/Group] : This visit was provided via telehealth using real-time 2-way audio visual technology. [Medical Office: ([facility])___] : The provider was located at the medical office in [unfilled]. [Home] : The patient, [unfilled], was located at home, [unfilled], at the time of the visit. [FreeTextEntry4] : 0367 [FreeTextEntry4] : 7322 [Patient] : Patient [FreeTextEntry1] : PTSD

## 2024-02-14 NOTE — END OF VISIT
[Individual Psychotherapy for 53+ minutes] : Individual Psychotherapy for 53+ minutes  [Teletherapy Service Provided] : The services provided in this session were delivered via tele-therapy [FreeTextEntry3] : Home [FreeTextEntry5] : [address] [facility]

## 2024-02-27 ENCOUNTER — APPOINTMENT (OUTPATIENT)
Dept: PSYCHIATRY | Facility: CLINIC | Age: 39
End: 2024-02-27
Payer: COMMERCIAL

## 2024-02-27 PROCEDURE — 90837 PSYTX W PT 60 MINUTES: CPT | Mod: 95

## 2024-02-28 NOTE — END OF VISIT
[Individual Psychotherapy for 53+ minutes] : Individual Psychotherapy for 53+ minutes  [Teletherapy Service Provided] : The services provided in this session were delivered via tele-therapy [FreeTextEntry3] : Home [FreeTextEntry5] : [address] [facility]

## 2024-02-28 NOTE — REASON FOR VISIT
[Patient preference] : as per patient preference [Medical Office: ([facility])___] : The provider was located at the medical office in [unfilled]. [Telehealth (audio & video) - Individual/Group] : This visit was provided via telehealth using real-time 2-way audio visual technology. [Home] : The patient, [unfilled], was located at home, [unfilled], at the time of the visit. [FreeTextEntry4] : 5384 [FreeTextEntry3] : 7169 [Patient] : Patient [FreeTextEntry1] : PTSD
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
## 2024-03-05 ENCOUNTER — APPOINTMENT (OUTPATIENT)
Dept: PSYCHIATRY | Facility: CLINIC | Age: 39
End: 2024-03-05

## 2024-03-12 ENCOUNTER — APPOINTMENT (OUTPATIENT)
Dept: PSYCHIATRY | Facility: CLINIC | Age: 39
End: 2024-03-12

## 2024-03-19 ENCOUNTER — APPOINTMENT (OUTPATIENT)
Dept: PSYCHIATRY | Facility: CLINIC | Age: 39
End: 2024-03-19
Payer: COMMERCIAL

## 2024-03-19 PROCEDURE — 90837 PSYTX W PT 60 MINUTES: CPT | Mod: 95

## 2024-03-20 NOTE — REASON FOR VISIT
[Patient preference] : as per patient preference [Telehealth (audio & video) - Individual/Group] : This visit was provided via telehealth using real-time 2-way audio visual technology. [Medical Office: ([facility])___] : The provider was located at the medical office in [unfilled]. [Home] : The patient, [unfilled], was located at home, [unfilled], at the time of the visit. [FreeTextEntry4] : 4325 [FreeTextEntry2] : 1294 [Patient] : Patient [FreeTextEntry1] : PTSD

## 2024-03-20 NOTE — END OF VISIT
[Individual Psychotherapy for 53+ minutes] : Individual Psychotherapy for 53+ minutes  [Teletherapy Service Provided] : The services provided in this session were delivered via tele-therapy [FreeTextEntry3] : Home [FreeTextEntry5] : [address] [facility]  Patient/Caregiver provided printed discharge information.
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
## 2024-03-25 ENCOUNTER — NON-APPOINTMENT (OUTPATIENT)
Age: 39
End: 2024-03-25

## 2024-03-26 ENCOUNTER — APPOINTMENT (OUTPATIENT)
Dept: PSYCHIATRY | Facility: CLINIC | Age: 39
End: 2024-03-26

## 2024-04-02 ENCOUNTER — APPOINTMENT (OUTPATIENT)
Dept: PSYCHIATRY | Facility: CLINIC | Age: 39
End: 2024-04-02
Payer: COMMERCIAL

## 2024-04-02 PROCEDURE — 90837 PSYTX W PT 60 MINUTES: CPT | Mod: 95

## 2024-04-03 NOTE — END OF VISIT
[Individual Psychotherapy for 53+ minutes] : Individual Psychotherapy for 53+ minutes  [Teletherapy Service Provided] : The services provided in this session were delivered via tele-therapy [FreeTextEntry3] : Home [FreeTextEntry5] : [address] [facility]

## 2024-04-03 NOTE — REASON FOR VISIT
[Patient preference] : as per patient preference [Telehealth (audio & video) - Individual/Group] : This visit was provided via telehealth using real-time 2-way audio visual technology. [Medical Office: ([facility])___] : The provider was located at the medical office in [unfilled]. [Home] : The patient, [unfilled], was located at home, [unfilled], at the time of the visit. [FreeTextEntry4] : 1201 [Patient] : Patient [FreeTextEntry9] : 1522 [FreeTextEntry1] : PTSD

## 2024-04-03 NOTE — PHYSICAL EXAM
[Average] : average [Cooperative] : cooperative [Euthymic] : euthymic [Clear] : clear [Full] : full [Linear/Goal Directed] : linear/goal directed [WNL] : within normal limits

## 2024-04-03 NOTE — PLAN
[FreeTextEntry2] : 1. To alleviate sx of depression and anxiety by utilizing coping skills \par  2. To educate pt on PTSD\par  3. To explore source of disturbances. [Supportive Therapy] : Supportive Therapy [Other: ____] : [unfilled] [de-identified] : Therapist and pt had session through TEB. Pt came in with a full affect and anxious mood. Pt stated that she was unable to do EMDR session because her significant other was home. Discussed dynamics and issues with them. Processed her anxiety and doubts about possibly dissolving the relationship. Ended session with a mindfulness meditation to ease her anxiety.    Denies s/i, h/i and/or plan. Denies av hallucinations.

## 2024-04-09 ENCOUNTER — APPOINTMENT (OUTPATIENT)
Dept: COLORECTAL SURGERY | Facility: CLINIC | Age: 39
End: 2024-04-09
Payer: COMMERCIAL

## 2024-04-09 ENCOUNTER — APPOINTMENT (OUTPATIENT)
Dept: PSYCHIATRY | Facility: CLINIC | Age: 39
End: 2024-04-09
Payer: COMMERCIAL

## 2024-04-09 VITALS
BODY MASS INDEX: 28.04 KG/M2 | WEIGHT: 185 LBS | RESPIRATION RATE: 15 BRPM | OXYGEN SATURATION: 96 % | SYSTOLIC BLOOD PRESSURE: 126 MMHG | TEMPERATURE: 97.3 F | HEIGHT: 68 IN | DIASTOLIC BLOOD PRESSURE: 86 MMHG | HEART RATE: 61 BPM

## 2024-04-09 DIAGNOSIS — K64.8 OTHER HEMORRHOIDS: ICD-10-CM

## 2024-04-09 DIAGNOSIS — K64.4 RESIDUAL HEMORRHOIDAL SKIN TAGS: ICD-10-CM

## 2024-04-09 PROCEDURE — 99203 OFFICE O/P NEW LOW 30 MIN: CPT | Mod: 25

## 2024-04-09 PROCEDURE — 90837 PSYTX W PT 60 MINUTES: CPT | Mod: 95

## 2024-04-09 PROCEDURE — 46600 DIAGNOSTIC ANOSCOPY SPX: CPT

## 2024-04-09 NOTE — HISTORY OF PRESENT ILLNESS
[FreeTextEntry1] : 38-year-old female who presents for evaluation after experiencing a bothersome hemorrhoid. She reports having ~1 month of perianal pruritus and subsequent swollen hemorrhoid that caused significant discomfort. She reports using hydrocortisone suppositories and ointments which helped to relieve her symptoms. She reports noticing an external hemorrhoid or redundant perianal tissue and was concerned it could be something or lead to something more ominous. Currently no discomfort, asymptomatic. No other complaints.

## 2024-04-09 NOTE — PHYSICAL EXAM
[Excoriation] : no perianal excoriation [Multiple Sinus Tracts] : no perianal sinus tracts [Fistula] : no fistulas [Wart] : no warts [Pilonidal Cyst] : no pilonidal cysts [Pilonidal Sinus] : no pilonidal sinus [Tender, Swollen] : nontender, non-swollen [Normal] : was normal [None] : there was no rectal abscess [de-identified] : COLLIN: Nontender, no gross blood, slightly prominent LL internal hemorrhoid [de-identified] : Small skin tag in association with left lateral hemorrhoidal column.  [de-identified] : NAD [de-identified] : Nonlabored breathing, [de-identified] : Normal rate

## 2024-04-09 NOTE — PROCEDURE
[FreeTextEntry1] : Anoscopy  I discussed the reason for the procedure, and the risks and benefits with the patient. Risks including bleeding and discomfort were discussed. The patient understood or discussion and would like to proceed with the procedure.  After completing a digital rectal exam a well lubricated anoscope was gently inserted into the anus. Complete inspection was performed. No tenderness on insertion of the anoscope, and the procedure was tolerated well. No fissures, fistula openings, enlarged hemorrhoids or masses were identified.  Findings: Slightly prominent left lateral external/internal hemorrhoid coloumn

## 2024-04-09 NOTE — ASSESSMENT
[FreeTextEntry1] : 38-year-old female who presents for evaluation of hemorrhoids. Findings on anoscopy: Slightly prominent left lateral external/internal hemorrhoid column. Findings discussed with the patient. As they her hemorrhoids are not symptomatic, I recommended conservative measures such as increased dietary and supplemental fiber along with adequate water intake to prevent straining. Patient will follow up with me as needed, or if she wishes to have a hemorrhoidectomy.

## 2024-04-10 NOTE — END OF VISIT
[Individual Psychotherapy for 53+ minutes] : Individual Psychotherapy for 53+ minutes  [Teletherapy Service Provided] : The services provided in this session were delivered via tele-therapy [FreeTextEntry3] : Home [FreeTextEntry5] : [address] [facility]

## 2024-04-10 NOTE — REASON FOR VISIT
[Patient preference] : as per patient preference [Telehealth (audio & video) - Individual/Group] : This visit was provided via telehealth using real-time 2-way audio visual technology. [Medical Office: ([facility])___] : The provider was located at the medical office in [unfilled]. [Home] : The patient, [unfilled], was located at home, [unfilled], at the time of the visit. [FreeTextEntry4] : 7132 [FreeTextEntry4] : 7998 [Patient] : Patient [FreeTextEntry1] : PTSD
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
## 2024-04-23 ENCOUNTER — APPOINTMENT (OUTPATIENT)
Dept: PSYCHIATRY | Facility: CLINIC | Age: 39
End: 2024-04-23
Payer: COMMERCIAL

## 2024-04-23 PROCEDURE — 90837 PSYTX W PT 60 MINUTES: CPT | Mod: 95

## 2024-04-24 NOTE — END OF VISIT
[Individual Psychotherapy for 53+ minutes] : Individual Psychotherapy for 53+ minutes  [Teletherapy Service Provided] : The services provided in this session were delivered via tele-therapy [FreeTextEntry3] : Home [FreeTextEntry5] : [address] [facility]

## 2024-04-24 NOTE — PLAN
[FreeTextEntry2] : 1. To alleviate sx of depression and anxiety by utilizing coping skills \par  2. To educate pt on PTSD\par  3. To explore source of disturbances. [Eye Movement Desensitization and Reprocessing Therapy (EMDR)] : Eye Movement Desensitization and Reprocessing Therapy (EMDR) [de-identified] : Therapist and pt had session through TE. Pt came in with a full affect and anxious mood. Pt discussed an issue she had with her partner. Discussed how  she interpreted his communication. Discussed ways she can regulate. Processed how pt can expand her sociability and not depend on her partner for his glass to be filled. Continue to work with pt on expressing her emotions in a safe manner.   Denies s/i, h/i and/or plan. Denies av hallucinations.

## 2024-04-24 NOTE — REASON FOR VISIT
[Patient preference] : as per patient preference [Telehealth (audio & video) - Individual/Group] : This visit was provided via telehealth using real-time 2-way audio visual technology. [Medical Office: ([facility])___] : The provider was located at the medical office in [unfilled]. [Home] : The patient, [unfilled], was located at home, [unfilled], at the time of the visit. [FreeTextEntry4] : 2268 [FreeTextEntry9] : 5412 [Patient] : Patient [FreeTextEntry1] : PTSD

## 2024-05-07 ENCOUNTER — APPOINTMENT (OUTPATIENT)
Dept: PSYCHIATRY | Facility: CLINIC | Age: 39
End: 2024-05-07
Payer: COMMERCIAL

## 2024-05-07 PROCEDURE — 90837 PSYTX W PT 60 MINUTES: CPT | Mod: 95

## 2024-05-07 NOTE — PLAN
[FreeTextEntry2] : 1. To alleviate sx of depression and anxiety by utilizing coping skills \par  2. To educate pt on PTSD\par  3. To explore source of disturbances. [Supportive Therapy] : Supportive Therapy [de-identified] : Therapist and pt had session through TEB. Pt came in with a full affect and anxious mood. Pt discussed an issue she had with her partner. Discussed how she feels an increase  of anger on his end. Therapist and pt discussed and brainstormed ways to de escalate any situations that may occur between her and her partner. Continue to work with pt to ground and regulate as she is going through this conflict.    Denies s/i, h/i and/or plan. Denies av hallucinations.

## 2024-05-07 NOTE — REASON FOR VISIT
[Patient preference] : as per patient preference [Telehealth (audio & video) - Individual/Group] : This visit was provided via telehealth using real-time 2-way audio visual technology. [Medical Office: ([facility])___] : The provider was located at the medical office in [unfilled]. [Home] : The patient, [unfilled], was located at home, [unfilled], at the time of the visit. [FreeTextEntry4] : 9486 [FreeTextEntry7] : 6517 [Patient] : Patient [FreeTextEntry1] : PTSD

## 2024-05-07 NOTE — END OF VISIT
[Individual Psychotherapy for 53+ minutes] : Individual Psychotherapy for 53+ minutes  [Teletherapy Service Provided] : The services provided in this session were delivered via tele-therapy [FreeTextEntry3] : Home [FreeTextEntry5] : [address] [facility]

## 2024-05-14 ENCOUNTER — APPOINTMENT (OUTPATIENT)
Dept: PSYCHIATRY | Facility: CLINIC | Age: 39
End: 2024-05-14
Payer: COMMERCIAL

## 2024-05-14 PROCEDURE — 90837 PSYTX W PT 60 MINUTES: CPT | Mod: 95

## 2024-05-21 ENCOUNTER — APPOINTMENT (OUTPATIENT)
Dept: PSYCHIATRY | Facility: CLINIC | Age: 39
End: 2024-05-21
Payer: COMMERCIAL

## 2024-05-21 PROCEDURE — 90837 PSYTX W PT 60 MINUTES: CPT | Mod: 95

## 2024-05-21 NOTE — PLAN
[FreeTextEntry2] : 1. To alleviate sx of depression and anxiety by utilizing coping skills \par  2. To educate pt on PTSD\par  3. To explore source of disturbances. [Supportive Therapy] : Supportive Therapy [de-identified] : Therapist and pt had session through TEB. Pt came in with a full affect and anxious mood. Pt continues to discuss issues shes having with her partner. Discussed how she feels an increase  of anger on his end. Therapist and pt discussed and brainstormed ways to de escalate any situations that may occur between her and her partner. Continue to work with pt to ground and regulate as she is going through this conflict.  Discussed children's feelings about this. 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   Denies s/i, h/i and/or plan. Denies av hallucinations.

## 2024-05-21 NOTE — END OF VISIT
[Individual Psychotherapy for 53+ minutes] : Individual Psychotherapy for 53+ minutes  [Teletherapy Service Provided] : The services provided in this session were delivered via tele-therapy [FreeTextEntry3] : Home [FreeTextEntry5] : [address] [facility]

## 2024-05-21 NOTE — END OF VISIT
[Individual Psychotherapy for 53+ minutes] : Individual Psychotherapy for 53+ minutes  [Teletherapy Service Provided] : The services provided in this session were delivered via tele-therapy [FreeTextEntry3] : Home [FreeTextEntry5] : [address] [facility]
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
## 2024-05-21 NOTE — REASON FOR VISIT
[Patient preference] : as per patient preference [Telehealth (audio & video) - Individual/Group] : This visit was provided via telehealth using real-time 2-way audio visual technology. [Medical Office: ([facility])___] : The provider was located at the medical office in [unfilled]. [Home] : The patient, [unfilled], was located at home, [unfilled], at the time of the visit. [FreeTextEntry4] : 1693 [FreeTextEntry9] : 1077 [Patient] : Patient [FreeTextEntry1] : PTSD

## 2024-05-21 NOTE — REASON FOR VISIT
[Patient preference] : as per patient preference [Telehealth (audio & video) - Individual/Group] : This visit was provided via telehealth using real-time 2-way audio visual technology. [Medical Office: ([facility])___] : The provider was located at the medical office in [unfilled]. [Home] : The patient, [unfilled], was located at home, [unfilled], at the time of the visit. [FreeTextEntry4] : 1287 [FreeTextEntry] : 5627 [Patient] : Patient [FreeTextEntry1] : PTSD

## 2024-05-28 ENCOUNTER — APPOINTMENT (OUTPATIENT)
Dept: PSYCHIATRY | Facility: CLINIC | Age: 39
End: 2024-05-28

## 2024-06-04 ENCOUNTER — APPOINTMENT (OUTPATIENT)
Dept: PSYCHIATRY | Facility: CLINIC | Age: 39
End: 2024-06-04
Payer: COMMERCIAL

## 2024-06-04 PROCEDURE — 90837 PSYTX W PT 60 MINUTES: CPT | Mod: 95

## 2024-06-05 NOTE — END OF VISIT
[Individual Psychotherapy for 53+ minutes] : Individual Psychotherapy for 53+ minutes  [Teletherapy Service Provided] : The services provided in this session were delivered via tele-therapy [FreeTextEntry3] : Home [FreeTextEntry5] : [address] [facility]

## 2024-06-05 NOTE — PLAN
[FreeTextEntry2] : 1. To alleviate sx of depression and anxiety by utilizing coping skills \par  2. To educate pt on PTSD\par  3. To explore source of disturbances. [Supportive Therapy] : Supportive Therapy [de-identified] : Therapist and pt had session through TE. Pt came in with a full affect and anxious mood. Pt continues to discuss issues shes having with her partner. Discussed how she feels on a day to day basis. Continue to work with pt on her emotional regulation and long term plans. PT wants to continue EMDR next session. No tech difficulties so far.    Denies s/i, h/i and/or plan. Denies av hallucinations.

## 2024-06-11 ENCOUNTER — APPOINTMENT (OUTPATIENT)
Dept: PSYCHIATRY | Facility: CLINIC | Age: 39
End: 2024-06-11

## 2024-06-18 ENCOUNTER — APPOINTMENT (OUTPATIENT)
Dept: PSYCHIATRY | Facility: CLINIC | Age: 39
End: 2024-06-18
Payer: COMMERCIAL

## 2024-06-18 PROCEDURE — 90837 PSYTX W PT 60 MINUTES: CPT | Mod: 95

## 2024-06-19 NOTE — END OF VISIT
[Individual Psychotherapy for 53+ minutes] : Individual Psychotherapy for 53+ minutes  [Teletherapy Service Provided] : The services provided in this session were delivered via tele-therapy [FreeTextEntry3] : Home [FreeTextEntry5] : [address] [facility]

## 2024-06-19 NOTE — PLAN
[FreeTextEntry2] : 1. To alleviate sx of depression and anxiety by utilizing coping skills \par  2. To educate pt on PTSD\par  3. To explore source of disturbances. [Supportive Therapy] : Supportive Therapy [de-identified] : Therapist and pt had session through TE. Pt came in with a full affect and anxious mood. Pt continues to discuss issues shes having with her partner. Discussed how she feels on a day to day basis. Continue to work with pt on her emotional regulation and long term plans. Because of pt being 10 minutes late, EMDR was not done. Processed loneliness and how she does not want to be like her mother.     Denies s/i, h/i and/or plan. Denies av hallucinations.

## 2024-06-19 NOTE — REASON FOR VISIT
[Patient preference] : as per patient preference [Telehealth (audio & video) - Individual/Group] : This visit was provided via telehealth using real-time 2-way audio visual technology. [Medical Office: ([facility])___] : The provider was located at the medical office in [unfilled]. [Home] : The patient, [unfilled], was located at home, [unfilled], at the time of the visit. [FreeTextEntry4] : 5416 [FreeTextEntry7] : 5107 [Patient] : Patient [FreeTextEntry1] : PTSD

## 2024-06-24 ENCOUNTER — APPOINTMENT (OUTPATIENT)
Dept: PSYCHIATRY | Facility: CLINIC | Age: 39
End: 2024-06-24
Payer: COMMERCIAL

## 2024-06-24 PROCEDURE — 99214 OFFICE O/P EST MOD 30 MIN: CPT | Mod: 95

## 2024-06-24 RX ORDER — ESCITALOPRAM OXALATE 5 MG/1
5 TABLET ORAL DAILY
Qty: 90 | Refills: 0 | Status: ACTIVE | COMMUNITY
Start: 2022-08-31 | End: 1900-01-01

## 2024-06-24 RX ORDER — ESCITALOPRAM OXALATE 10 MG/1
10 TABLET ORAL
Qty: 90 | Refills: 0 | Status: ACTIVE | COMMUNITY
Start: 2022-08-31 | End: 1900-01-01

## 2024-06-24 RX ORDER — BUPROPION HYDROCHLORIDE 150 MG/1
150 TABLET, EXTENDED RELEASE ORAL
Qty: 90 | Refills: 0 | Status: ACTIVE | COMMUNITY
Start: 2023-06-05 | End: 1900-01-01

## 2024-06-25 ENCOUNTER — APPOINTMENT (OUTPATIENT)
Dept: PSYCHIATRY | Facility: CLINIC | Age: 39
End: 2024-06-25

## 2024-07-02 ENCOUNTER — APPOINTMENT (OUTPATIENT)
Dept: PSYCHIATRY | Facility: CLINIC | Age: 39
End: 2024-07-02
Payer: COMMERCIAL

## 2024-07-02 ENCOUNTER — APPOINTMENT (OUTPATIENT)
Dept: INTERNAL MEDICINE | Facility: CLINIC | Age: 39
End: 2024-07-02
Payer: COMMERCIAL

## 2024-07-02 VITALS
RESPIRATION RATE: 12 BRPM | BODY MASS INDEX: 28.34 KG/M2 | OXYGEN SATURATION: 100 % | SYSTOLIC BLOOD PRESSURE: 125 MMHG | WEIGHT: 187 LBS | DIASTOLIC BLOOD PRESSURE: 71 MMHG | HEART RATE: 56 BPM | HEIGHT: 68 IN

## 2024-07-02 DIAGNOSIS — E01.0 IODINE-DEFICIENCY RELATED DIFFUSE (ENDEMIC) GOITER: ICD-10-CM

## 2024-07-02 DIAGNOSIS — Z00.00 ENCOUNTER FOR GENERAL ADULT MEDICAL EXAMINATION W/OUT ABNORMAL FINDINGS: ICD-10-CM

## 2024-07-02 PROCEDURE — 93000 ELECTROCARDIOGRAM COMPLETE: CPT | Mod: 59

## 2024-07-02 PROCEDURE — G0444 DEPRESSION SCREEN ANNUAL: CPT | Mod: 59

## 2024-07-02 PROCEDURE — 99385 PREV VISIT NEW AGE 18-39: CPT

## 2024-07-02 PROCEDURE — 90837 PSYTX W PT 60 MINUTES: CPT | Mod: 95

## 2024-07-02 PROCEDURE — 36415 COLL VENOUS BLD VENIPUNCTURE: CPT

## 2024-07-03 LAB
ALBUMIN SERPL ELPH-MCNC: 4.1 G/DL
ALP BLD-CCNC: 101 U/L
ALT SERPL-CCNC: 15 U/L
ANION GAP SERPL CALC-SCNC: 9 MMOL/L
APPEARANCE: CLEAR
AST SERPL-CCNC: 13 U/L
BACTERIA: NEGATIVE /HPF
BASOPHILS # BLD AUTO: 0.06 K/UL
BASOPHILS NFR BLD AUTO: 0.8 %
BILIRUB SERPL-MCNC: 0.5 MG/DL
BILIRUBIN URINE: NEGATIVE
BLOOD URINE: NEGATIVE
BUN SERPL-MCNC: 12 MG/DL
CALCIUM SERPL-MCNC: 9.1 MG/DL
CAST: 0 /LPF
CHLORIDE SERPL-SCNC: 103 MMOL/L
CHOLEST SERPL-MCNC: 220 MG/DL
CO2 SERPL-SCNC: 24 MMOL/L
COLOR: YELLOW
CREAT SERPL-MCNC: 0.71 MG/DL
EGFR: 112 ML/MIN/1.73M2
EOSINOPHIL # BLD AUTO: 0.36 K/UL
EOSINOPHIL NFR BLD AUTO: 4.8 %
EPITHELIAL CELLS: 8 /HPF
ESTIMATED AVERAGE GLUCOSE: 103 MG/DL
GLUCOSE QUALITATIVE U: NEGATIVE MG/DL
GLUCOSE SERPL-MCNC: 98 MG/DL
HBA1C MFR BLD HPLC: 5.2 %
HCT VFR BLD CALC: 39.3 %
HDLC SERPL-MCNC: 61 MG/DL
HGB BLD-MCNC: 12.5 G/DL
IMM GRANULOCYTES NFR BLD AUTO: 0.3 %
KETONES URINE: NEGATIVE MG/DL
LDLC SERPL CALC-MCNC: 137 MG/DL
LEUKOCYTE ESTERASE URINE: ABNORMAL
LYMPHOCYTES # BLD AUTO: 2.09 K/UL
LYMPHOCYTES NFR BLD AUTO: 27.8 %
MAN DIFF?: NORMAL
MCHC RBC-ENTMCNC: 30.1 PG
MCHC RBC-ENTMCNC: 31.8 GM/DL
MCV RBC AUTO: 94.7 FL
MICROSCOPIC-UA: NORMAL
MONOCYTES # BLD AUTO: 0.32 K/UL
MONOCYTES NFR BLD AUTO: 4.2 %
NEUTROPHILS # BLD AUTO: 4.68 K/UL
NEUTROPHILS NFR BLD AUTO: 62.1 %
NITRITE URINE: NEGATIVE
NONHDLC SERPL-MCNC: 160 MG/DL
PH URINE: 6
PLATELET # BLD AUTO: 289 K/UL
POTASSIUM SERPL-SCNC: 4.5 MMOL/L
PROT SERPL-MCNC: 6.3 G/DL
PROTEIN URINE: NEGATIVE MG/DL
RBC # BLD: 4.15 M/UL
RBC # FLD: 12.4 %
RED BLOOD CELLS URINE: 1 /HPF
SODIUM SERPL-SCNC: 137 MMOL/L
SPECIFIC GRAVITY URINE: 1.02
T4 FREE SERPL-MCNC: 1 NG/DL
TRIGL SERPL-MCNC: 126 MG/DL
TSH SERPL-ACNC: 1.23 UIU/ML
UROBILINOGEN URINE: 0.2 MG/DL
WBC # FLD AUTO: 7.53 K/UL
WHITE BLOOD CELLS URINE: 0 /HPF

## 2024-07-09 ENCOUNTER — APPOINTMENT (OUTPATIENT)
Dept: PSYCHIATRY | Facility: CLINIC | Age: 39
End: 2024-07-09
Payer: COMMERCIAL

## 2024-07-09 PROCEDURE — 90837 PSYTX W PT 60 MINUTES: CPT | Mod: 95

## 2024-07-16 ENCOUNTER — APPOINTMENT (OUTPATIENT)
Dept: PSYCHIATRY | Facility: CLINIC | Age: 39
End: 2024-07-16
Payer: COMMERCIAL

## 2024-07-16 PROCEDURE — 90837 PSYTX W PT 60 MINUTES: CPT | Mod: 95

## 2024-07-23 ENCOUNTER — APPOINTMENT (OUTPATIENT)
Dept: PSYCHIATRY | Facility: CLINIC | Age: 39
End: 2024-07-23
Payer: COMMERCIAL

## 2024-07-23 PROCEDURE — 90837 PSYTX W PT 60 MINUTES: CPT | Mod: 95

## 2024-07-24 NOTE — END OF VISIT
[Duration of Psychotherapy Visit (minutes spent in synchronous communication): ____] : Duration of Psychotherapy Visit (minutes spent in synchronous communication): [unfilled] [Individual Psychotherapy for 53+ minutes] : Individual Psychotherapy for 53+ minutes  [Teletherapy Service Provided] : The services provided in this session were delivered via tele-therapy [FreeTextEntry3] : Home [FreeTextEntry5] : [address] [facility]

## 2024-07-24 NOTE — REASON FOR VISIT
[Patient preference] : as per patient preference [Telehealth (audio & video) - Individual/Group] : This visit was provided via telehealth using real-time 2-way audio visual technology. [Medical Office: ([facility])___] : The provider was located at the medical office in [unfilled]. [Home] : The patient, [unfilled], was located at home, [unfilled], at the time of the visit. [Verbal consent obtained from patient/other participant(s)] : Verbal consent for telehealth/telephonic services obtained from patient/other participant(s) [FreeTextEntry4] : 4392 [FreeTextEntry9] : 1802 [Patient] : Patient [FreeTextEntry1] : PTSD

## 2024-07-24 NOTE — PLAN
[FreeTextEntry2] : 1. To alleviate sx of depression and anxiety by utilizing coping skills \par  2. To educate pt on PTSD\par  3. To explore source of disturbances. [Supportive Therapy] : Supportive Therapy [Return in ____ week(s)] : Return in [unfilled] week(s) [de-identified] : Therapist and pt had session through TE. Pt came in with a full affect and euthymic mood. Pt discussed conflict with her partner. Processed ways she attempted to help relationship with assertive and healthy communication styles discussed with therapist in previous sessions. Pt reported that this did not help her. Therapist and pt discussed how to ground . Pt has been more motivated on her goal setting.      Denies s/i, h/i and/or plan. Denies av hallucinations.

## 2024-07-26 ENCOUNTER — APPOINTMENT (OUTPATIENT)
Dept: ULTRASOUND IMAGING | Facility: CLINIC | Age: 39
End: 2024-07-26
Payer: COMMERCIAL

## 2024-07-26 ENCOUNTER — APPOINTMENT (OUTPATIENT)
Dept: MAMMOGRAPHY | Facility: CLINIC | Age: 39
End: 2024-07-26
Payer: COMMERCIAL

## 2024-07-26 PROCEDURE — 77066 DX MAMMO INCL CAD BI: CPT | Mod: 26

## 2024-07-26 PROCEDURE — 76856 US EXAM PELVIC COMPLETE: CPT | Mod: 26

## 2024-07-26 PROCEDURE — 76830 TRANSVAGINAL US NON-OB: CPT | Mod: 26

## 2024-07-26 PROCEDURE — G0279: CPT | Mod: 26

## 2024-07-26 PROCEDURE — 76641 ULTRASOUND BREAST COMPLETE: CPT | Mod: 26,50

## 2024-07-27 ENCOUNTER — TRANSCRIPTION ENCOUNTER (OUTPATIENT)
Age: 39
End: 2024-07-27

## 2024-08-06 ENCOUNTER — APPOINTMENT (OUTPATIENT)
Dept: PSYCHIATRY | Facility: CLINIC | Age: 39
End: 2024-08-06

## 2024-08-06 PROCEDURE — 90837 PSYTX W PT 60 MINUTES: CPT | Mod: 95
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
## 2024-08-06 NOTE — END OF VISIT
[Duration of Psychotherapy Visit (minutes spent in synchronous communication): ____] : Duration of Psychotherapy Visit (minutes spent in synchronous communication): [unfilled] [Individual Psychotherapy for 53+ minutes] : Individual Psychotherapy for 53+ minutes  [Teletherapy Service Provided] : The services provided in this session were delivered via tele-therapy [FreeTextEntry3] : Home [FreeTextEntry5] : [address] [facility]

## 2024-08-06 NOTE — REASON FOR VISIT
[Patient preference] : as per patient preference [Telehealth (audio & video) - Individual/Group] : This visit was provided via telehealth using real-time 2-way audio visual technology. [Medical Office: ([facility])___] : The provider was located at the medical office in [unfilled]. [Home] : The patient, [unfilled], was located at home, [unfilled], at the time of the visit. [Verbal consent obtained from patient/other participant(s)] : Verbal consent for telehealth/telephonic services obtained from patient/other participant(s) [FreeTextEntry4] : 9861 [FreeTextEntry8] : 4408 [Patient] : Patient [FreeTextEntry1] : PTSD

## 2024-08-13 ENCOUNTER — APPOINTMENT (OUTPATIENT)
Dept: PSYCHIATRY | Facility: CLINIC | Age: 39
End: 2024-08-13
Payer: COMMERCIAL

## 2024-08-13 PROCEDURE — 90834 PSYTX W PT 45 MINUTES: CPT | Mod: 95

## 2024-08-13 NOTE — END OF VISIT
[Duration of Psychotherapy Visit (minutes spent in synchronous communication): ____] : Duration of Psychotherapy Visit (minutes spent in synchronous communication): [unfilled] [Individual Psychotherapy for 38-52 minutes] : Individual Psychotherapy for 38 - 52 minutes [Teletherapy Service Provided] : The services provided in this session were delivered via tele-therapy [FreeTextEntry3] : Home [FreeTextEntry5] : [address] [facility]
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
## 2024-08-13 NOTE — REASON FOR VISIT
[Patient preference] : as per patient preference [Telehealth (audio & video) - Individual/Group] : This visit was provided via telehealth using real-time 2-way audio visual technology. [Medical Office: ([facility])___] : The provider was located at the medical office in [unfilled]. [Home] : The patient, [unfilled], was located at home, [unfilled], at the time of the visit. [Verbal consent obtained from patient/other participant(s)] : Verbal consent for telehealth/telephonic services obtained from patient/other participant(s) [FreeTextEntry4] : 6598 [FreeTextEntry5] : 5136 [Patient] : Patient [FreeTextEntry1] : PTSD

## 2024-08-20 ENCOUNTER — APPOINTMENT (OUTPATIENT)
Dept: PSYCHIATRY | Facility: CLINIC | Age: 39
End: 2024-08-20
Payer: COMMERCIAL

## 2024-08-20 PROCEDURE — 90837 PSYTX W PT 60 MINUTES: CPT | Mod: 95

## 2024-08-21 NOTE — END OF VISIT
[Duration of Psychotherapy Visit (minutes spent in synchronous communication): ____] : Duration of Psychotherapy Visit (minutes spent in synchronous communication): [unfilled] [Individual Psychotherapy for 53+ minutes] : Individual Psychotherapy for 53+ minutes  [Teletherapy Service Provided] : The services provided in this session were delivered via tele-therapy [FreeTextEntry3] : Home [FreeTextEntry5] : [address] [facility]

## 2024-08-21 NOTE — REASON FOR VISIT
[Patient preference] : as per patient preference [Telehealth (audio & video) - Individual/Group] : This visit was provided via telehealth using real-time 2-way audio visual technology. [Medical Office: ([facility])___] : The provider was located at the medical office in [unfilled]. [Home] : The patient, [unfilled], was located at home, [unfilled], at the time of the visit. [Verbal consent obtained from patient/other participant(s)] : Verbal consent for telehealth/telephonic services obtained from patient/other participant(s) [FreeTextEntry4] : 7519 [FreeTextEntry5] : 2852 [Patient] : Patient [FreeTextEntry1] : PTSD

## 2024-08-21 NOTE — PLAN
[FreeTextEntry2] : 1. To alleviate sx of depression and anxiety by utilizing coping skills \par  2. To educate pt on PTSD\par  3. To explore source of disturbances. [Supportive Therapy] : Supportive Therapy [de-identified] : Therapist and pt had session through TEB. Pt came in with a full affect and sad mood. Pt discussed ongoing issues regarding her relationship with her partner. Pt was wept through the session as she told him how she is feeling. Pt feels like he was relieved that she said these things. Therapist held space for pt and praised her for speaking her feelings/mind regarding the relationship. Continue to hold space for pt as she goes through this.     Denies s/i, h/i and/or plan. Denies av hallucinations.  [Return in ____ week(s)] : Return in [unfilled] week(s)

## 2024-08-27 ENCOUNTER — APPOINTMENT (OUTPATIENT)
Dept: PSYCHIATRY | Facility: CLINIC | Age: 39
End: 2024-08-27
Payer: COMMERCIAL

## 2024-08-27 PROCEDURE — 90837 PSYTX W PT 60 MINUTES: CPT | Mod: 95

## 2024-08-27 NOTE — PLAN
[FreeTextEntry2] : 1. To alleviate sx of depression and anxiety by utilizing coping skills \par  2. To educate pt on PTSD\par  3. To explore source of disturbances. [Supportive Therapy] : Supportive Therapy [de-identified] : Therapist and pt had session through TE. Pt came in with a full affect and sad mood. Pt discussed ongoing issues regarding her relationship with her partner. Pt reported continuous issues with partner but she reported she is taking ownership of things that are going on. Pt is making an effort to drive by herself and take a course. Discussed her son going to  next week and canceling with therapist. Processed what that meant to her and grieving .    Denies s/i, h/i and/or plan. Denies av hallucinations.  [Return in ____ week(s)] : Return in [unfilled] week(s)

## 2024-08-27 NOTE — END OF VISIT
[Duration of Psychotherapy Visit (minutes spent in synchronous communication): ____] : Duration of Psychotherapy Visit (minutes spent in synchronous communication): [unfilled] [Individual Psychotherapy for 53+ minutes] : Individual Psychotherapy for 53+ minutes  [Teletherapy Service Provided] : The services provided in this session were delivered via tele-therapy [FreeTextEntry3] : Home [FreeTextEntry5] : [address] [facility]

## 2024-08-27 NOTE — REASON FOR VISIT
[Patient preference] : as per patient preference [Telehealth (audio & video) - Individual/Group] : This visit was provided via telehealth using real-time 2-way audio visual technology. [Medical Office: ([facility])___] : The provider was located at the medical office in [unfilled]. [Home] : The patient, [unfilled], was located at home, [unfilled], at the time of the visit. [Verbal consent obtained from patient/other participant(s)] : Verbal consent for telehealth/telephonic services obtained from patient/other participant(s) [FreeTextEntry4] : 2844 [FreeTextEntry5] : 5442 [Patient] : Patient [FreeTextEntry1] : PTSD

## 2024-09-10 ENCOUNTER — APPOINTMENT (OUTPATIENT)
Dept: PSYCHIATRY | Facility: CLINIC | Age: 39
End: 2024-09-10
Payer: COMMERCIAL

## 2024-09-10 PROCEDURE — 90834 PSYTX W PT 45 MINUTES: CPT | Mod: 95

## 2024-09-11 NOTE — END OF VISIT
[Duration of Psychotherapy Visit (minutes spent in synchronous communication): ____] : Duration of Psychotherapy Visit (minutes spent in synchronous communication): [unfilled] [Individual Psychotherapy for 38-52 minutes] : Individual Psychotherapy for 38 - 52 minutes [Teletherapy Service Provided] : The services provided in this session were delivered via tele-therapy [FreeTextEntry3] : Home [FreeTextEntry5] : [address] [facility]

## 2024-09-11 NOTE — REASON FOR VISIT
[Patient preference] : as per patient preference [Telehealth (audio & video) - Individual/Group] : This visit was provided via telehealth using real-time 2-way audio visual technology. [Medical Office: ([facility])___] : The provider was located at the medical office in [unfilled]. [Home] : The patient, [unfilled], was located at home, [unfilled], at the time of the visit. [Patient's space is appropriate for telehealth and maintains privacy/confidentiality.] : Patient's space is appropriate for telehealth and maintains privacy/confidentiality. [Verbal consent obtained from patient/other participant(s)] : Verbal consent for telehealth/telephonic services obtained from patient/other participant(s) [Patient] : Patient [FreeTextEntry4] : 0924 [FreeTextEntry5] : 4214 [FreeTextEntry1] : PTSD

## 2024-09-11 NOTE — PLAN
[Supportive Therapy] : Supportive Therapy [Return in ____ week(s)] : Return in [unfilled] week(s) [FreeTextEntry2] : 1. To alleviate sx of depression and anxiety by utilizing coping skills \par  2. To educate pt on PTSD\par  3. To explore source of disturbances. [de-identified] : Therapist and pt had session through TEB. Pt came in with a full affect and sad mood. Pt discussed how her son did not qualify for the [employer] due to his ADHD. Therapist and pt discussed how this has not only affected him but also has affected the family. Therapist continue to work with pt on her feelings about her current relationship and her current goals. Held space as pt got emotional in regards to her conflicts in relationships.     Denies s/i, h/i and/or plan. Denies av hallucinations.

## 2024-09-17 ENCOUNTER — APPOINTMENT (OUTPATIENT)
Dept: PSYCHIATRY | Facility: CLINIC | Age: 39
End: 2024-09-17
Payer: COMMERCIAL

## 2024-09-17 PROCEDURE — 90837 PSYTX W PT 60 MINUTES: CPT | Mod: 95

## 2024-09-18 NOTE — REASON FOR VISIT
[Patient preference] : as per patient preference [Telehealth (audio & video) - Individual/Group] : This visit was provided via telehealth using real-time 2-way audio visual technology. [Medical Office: ([facility])___] : The provider was located at the medical office in [unfilled]. [Home] : The patient, [unfilled], was located at home, [unfilled], at the time of the visit. [Patient's space is appropriate for telehealth and maintains privacy/confidentiality.] : Patient's space is appropriate for telehealth and maintains privacy/confidentiality. [Verbal consent obtained from patient/other participant(s)] : Verbal consent for telehealth/telephonic services obtained from patient/other participant(s) [FreeTextEntry4] : 8925 [FreeTextEntry5] : 1392 [Patient] : Patient [FreeTextEntry1] : PTSD

## 2024-09-18 NOTE — END OF VISIT
[Duration of Psychotherapy Visit (minutes spent in synchronous communication): ____] : Duration of Psychotherapy Visit (minutes spent in synchronous communication): [unfilled] [Individual Psychotherapy for 53+ minutes] : Individual Psychotherapy for 53+ minutes  [Teletherapy Service Provided] : The services provided in this session were delivered via tele-therapy [FreeTextEntry3] : Home [FreeTextEntry5] : [address] [facility]
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
## 2024-09-24 ENCOUNTER — APPOINTMENT (OUTPATIENT)
Dept: PSYCHIATRY | Facility: CLINIC | Age: 39
End: 2024-09-24
Payer: COMMERCIAL

## 2024-09-24 PROCEDURE — 90837 PSYTX W PT 60 MINUTES: CPT | Mod: 95

## 2024-09-25 NOTE — REASON FOR VISIT
[Patient preference] : as per patient preference [Telehealth (audio & video) - Individual/Group] : This visit was provided via telehealth using real-time 2-way audio visual technology. [Medical Office: ([facility])___] : The provider was located at the medical office in [unfilled]. [Home] : The patient, [unfilled], was located at home, [unfilled], at the time of the visit. [Patient's space is appropriate for telehealth and maintains privacy/confidentiality.] : Patient's space is appropriate for telehealth and maintains privacy/confidentiality. [Verbal consent obtained from patient/other participant(s)] : Verbal consent for telehealth/telephonic services obtained from patient/other participant(s) [FreeTextEntry4] : 0640 [FreeTextEntry5] : 8837 [Patient] : Patient [FreeTextEntry1] : PTSD

## 2024-09-25 NOTE — PLAN
[FreeTextEntry2] : 1. To alleviate sx of depression and anxiety by utilizing coping skills \par  2. To educate pt on PTSD\par  3. To explore source of disturbances. [Supportive Therapy] : Supportive Therapy [de-identified] : Therapist and pt had session through TEB. Pt came in with a full affect and sad mood. Pt reported an uptick in arguing this week. Pt stated her partner wrote her a letter and went through it with therapist. Processed the issues around this. Therapist held space for her to process. Utilized solution focused techniques, therapist assisted pt with some difficult decision making and boundaries. Confronted pts communication style and encouraged her to utilize " I feel" statements.  Denies s/i, h/i and/or plan. Denies av hallucinations.  [Return in ____ week(s)] : Return in [unfilled] week(s)

## 2024-09-25 NOTE — END OF VISIT
[Duration of Psychotherapy Visit (minutes spent in synchronous communication): ____] : Duration of Psychotherapy Visit (minutes spent in synchronous communication): [unfilled] [Individual Psychotherapy for 53+ minutes] : Individual Psychotherapy for 53+ minutes  [Teletherapy Service Provided] : The services provided in this session were delivered via tele-therapy [FreeTextEntry3] : Home [FreeTextEntry5] : [address] [facility]

## 2024-09-30 ENCOUNTER — NON-APPOINTMENT (OUTPATIENT)
Age: 39
End: 2024-09-30

## 2024-10-01 ENCOUNTER — APPOINTMENT (OUTPATIENT)
Dept: PSYCHIATRY | Facility: CLINIC | Age: 39
End: 2024-10-01

## 2024-10-01 ENCOUNTER — NON-APPOINTMENT (OUTPATIENT)
Age: 39
End: 2024-10-01

## 2024-10-03 ENCOUNTER — APPOINTMENT (OUTPATIENT)
Dept: PSYCHIATRY | Facility: CLINIC | Age: 39
End: 2024-10-03

## 2024-10-08 ENCOUNTER — APPOINTMENT (OUTPATIENT)
Dept: PSYCHIATRY | Facility: CLINIC | Age: 39
End: 2024-10-08

## 2024-10-15 ENCOUNTER — APPOINTMENT (OUTPATIENT)
Dept: PSYCHIATRY | Facility: CLINIC | Age: 39
End: 2024-10-15

## 2024-10-22 ENCOUNTER — APPOINTMENT (OUTPATIENT)
Dept: PSYCHIATRY | Facility: CLINIC | Age: 39
End: 2024-10-22
Payer: COMMERCIAL

## 2024-10-22 DIAGNOSIS — F43.10 POST-TRAUMATIC STRESS DISORDER, UNSPECIFIED: ICD-10-CM

## 2024-10-22 DIAGNOSIS — F41.9 ANXIETY DISORDER, UNSPECIFIED: ICD-10-CM

## 2024-10-22 DIAGNOSIS — F32.A ANXIETY DISORDER, UNSPECIFIED: ICD-10-CM

## 2024-10-22 PROCEDURE — 90837 PSYTX W PT 60 MINUTES: CPT | Mod: 95

## 2024-10-29 ENCOUNTER — APPOINTMENT (OUTPATIENT)
Dept: PSYCHIATRY | Facility: CLINIC | Age: 39
End: 2024-10-29
Payer: COMMERCIAL

## 2024-10-29 PROCEDURE — 90834 PSYTX W PT 45 MINUTES: CPT | Mod: 95

## 2024-11-05 ENCOUNTER — APPOINTMENT (OUTPATIENT)
Dept: PSYCHIATRY | Facility: CLINIC | Age: 39
End: 2024-11-05
Payer: COMMERCIAL

## 2024-11-05 PROCEDURE — 90837 PSYTX W PT 60 MINUTES: CPT | Mod: 95

## 2024-11-19 ENCOUNTER — APPOINTMENT (OUTPATIENT)
Dept: PSYCHIATRY | Facility: CLINIC | Age: 39
End: 2024-11-19
Payer: COMMERCIAL

## 2024-11-19 DIAGNOSIS — F41.9 ANXIETY DISORDER, UNSPECIFIED: ICD-10-CM

## 2024-11-19 DIAGNOSIS — F43.10 POST-TRAUMATIC STRESS DISORDER, UNSPECIFIED: ICD-10-CM

## 2024-11-19 DIAGNOSIS — F32.A ANXIETY DISORDER, UNSPECIFIED: ICD-10-CM

## 2024-11-19 PROCEDURE — 90837 PSYTX W PT 60 MINUTES: CPT | Mod: 95

## 2024-11-26 ENCOUNTER — APPOINTMENT (OUTPATIENT)
Dept: PSYCHIATRY | Facility: CLINIC | Age: 39
End: 2024-11-26

## 2024-12-10 ENCOUNTER — APPOINTMENT (OUTPATIENT)
Dept: PSYCHIATRY | Facility: CLINIC | Age: 39
End: 2024-12-10
Payer: COMMERCIAL

## 2024-12-10 PROCEDURE — 90837 PSYTX W PT 60 MINUTES: CPT | Mod: 95

## 2025-01-07 ENCOUNTER — APPOINTMENT (OUTPATIENT)
Dept: PSYCHIATRY | Facility: CLINIC | Age: 40
End: 2025-01-07
Payer: COMMERCIAL

## 2025-01-07 DIAGNOSIS — F32.A ANXIETY DISORDER, UNSPECIFIED: ICD-10-CM

## 2025-01-07 DIAGNOSIS — F43.10 POST-TRAUMATIC STRESS DISORDER, UNSPECIFIED: ICD-10-CM

## 2025-01-07 DIAGNOSIS — F41.9 ANXIETY DISORDER, UNSPECIFIED: ICD-10-CM

## 2025-01-07 PROCEDURE — 90837 PSYTX W PT 60 MINUTES: CPT | Mod: 95

## 2025-01-15 ENCOUNTER — TRANSCRIPTION ENCOUNTER (OUTPATIENT)
Age: 40
End: 2025-01-15

## 2025-01-21 ENCOUNTER — APPOINTMENT (OUTPATIENT)
Dept: PSYCHIATRY | Facility: CLINIC | Age: 40
End: 2025-01-21
Payer: COMMERCIAL

## 2025-01-21 DIAGNOSIS — F43.10 POST-TRAUMATIC STRESS DISORDER, UNSPECIFIED: ICD-10-CM

## 2025-01-21 DIAGNOSIS — F41.9 ANXIETY DISORDER, UNSPECIFIED: ICD-10-CM

## 2025-01-21 DIAGNOSIS — F32.A ANXIETY DISORDER, UNSPECIFIED: ICD-10-CM

## 2025-01-21 PROCEDURE — 90837 PSYTX W PT 60 MINUTES: CPT | Mod: 95

## 2025-02-04 ENCOUNTER — APPOINTMENT (OUTPATIENT)
Dept: PSYCHIATRY | Facility: CLINIC | Age: 40
End: 2025-02-04
Payer: COMMERCIAL

## 2025-02-04 PROCEDURE — 90837 PSYTX W PT 60 MINUTES: CPT | Mod: 95

## 2025-02-18 ENCOUNTER — APPOINTMENT (OUTPATIENT)
Dept: PSYCHIATRY | Facility: CLINIC | Age: 40
End: 2025-02-18
Payer: COMMERCIAL

## 2025-02-18 DIAGNOSIS — F41.9 ANXIETY DISORDER, UNSPECIFIED: ICD-10-CM

## 2025-02-18 DIAGNOSIS — F32.A ANXIETY DISORDER, UNSPECIFIED: ICD-10-CM

## 2025-02-18 DIAGNOSIS — F43.10 POST-TRAUMATIC STRESS DISORDER, UNSPECIFIED: ICD-10-CM

## 2025-02-18 PROCEDURE — 90837 PSYTX W PT 60 MINUTES: CPT | Mod: 95

## 2025-03-04 ENCOUNTER — APPOINTMENT (OUTPATIENT)
Dept: PSYCHIATRY | Facility: CLINIC | Age: 40
End: 2025-03-04
Payer: COMMERCIAL

## 2025-03-04 DIAGNOSIS — F43.10 POST-TRAUMATIC STRESS DISORDER, UNSPECIFIED: ICD-10-CM

## 2025-03-04 PROCEDURE — 90837 PSYTX W PT 60 MINUTES: CPT | Mod: 95

## 2025-03-05 ENCOUNTER — TRANSCRIPTION ENCOUNTER (OUTPATIENT)
Age: 40
End: 2025-03-05

## 2025-03-18 ENCOUNTER — APPOINTMENT (OUTPATIENT)
Dept: PSYCHIATRY | Facility: CLINIC | Age: 40
End: 2025-03-18
Payer: COMMERCIAL

## 2025-03-18 DIAGNOSIS — F32.A ANXIETY DISORDER, UNSPECIFIED: ICD-10-CM

## 2025-03-18 DIAGNOSIS — F41.9 ANXIETY DISORDER, UNSPECIFIED: ICD-10-CM

## 2025-03-18 PROCEDURE — 90837 PSYTX W PT 60 MINUTES: CPT | Mod: 95

## 2025-03-26 NOTE — REASON FOR VISIT
Jacquelyn Copeland is a 54 year old female presenting for   Chief Complaint   Patient presents with    Office Visit    TB Test    Weight Problem       Patient is accompanied by no family    Concerns: establish care, wants tb test. Weight problem    Meds & Allergies:   Medications and allergies were reviewed and updated.  denies known Latex allergy or Latex sensitivity.     Refills:  Refills needed today?  NO       Health Maintenance       Hepatitis B Vaccine (3 of 3 - 19+ 3-dose series)  Overdue since 9/2/1998    Cervical Cancer Screening (View Topic Details)  Never done    Colorectal Cancer Screening (View Topic Details)  Never done    Pneumococcal Vaccine 50+ (1 of 1 - PCV)  Never done    COVID-19 Vaccine (4 - 2024-25 season)  Overdue since 9/1/2024           Following review of the above:  Pended orders  Patient wishes to discuss with clinician: Hep B    Note: Refer to final orders and clinician documentation.          Patient would like communication of their results via:    [facility]    Is patient okay with using [employer] system?  Yes      When was your last mammogram(at age 40-70)? 2/21/2025      When was your last Pap smear(at age 22)? 11/10/2023       [Initial Evaluation] : an initial evaluation [FreeTextEntry1] : Hemorrhoids

## 2025-04-01 ENCOUNTER — APPOINTMENT (OUTPATIENT)
Dept: PSYCHIATRY | Facility: CLINIC | Age: 40
End: 2025-04-01
Payer: COMMERCIAL

## 2025-04-01 DIAGNOSIS — F43.10 POST-TRAUMATIC STRESS DISORDER, UNSPECIFIED: ICD-10-CM

## 2025-04-01 PROCEDURE — 90837 PSYTX W PT 60 MINUTES: CPT | Mod: 95

## 2025-04-15 ENCOUNTER — APPOINTMENT (OUTPATIENT)
Dept: PSYCHIATRY | Facility: CLINIC | Age: 40
End: 2025-04-15
Payer: COMMERCIAL

## 2025-04-15 PROCEDURE — 90837 PSYTX W PT 60 MINUTES: CPT | Mod: 95

## 2025-04-16 ENCOUNTER — TRANSCRIPTION ENCOUNTER (OUTPATIENT)
Age: 40
End: 2025-04-16

## 2025-04-25 ENCOUNTER — TRANSCRIPTION ENCOUNTER (OUTPATIENT)
Age: 40
End: 2025-04-25

## 2025-04-29 ENCOUNTER — APPOINTMENT (OUTPATIENT)
Dept: PSYCHIATRY | Facility: CLINIC | Age: 40
End: 2025-04-29
Payer: COMMERCIAL

## 2025-04-29 DIAGNOSIS — F41.9 ANXIETY DISORDER, UNSPECIFIED: ICD-10-CM

## 2025-04-29 DIAGNOSIS — F43.10 POST-TRAUMATIC STRESS DISORDER, UNSPECIFIED: ICD-10-CM

## 2025-04-29 DIAGNOSIS — F32.A ANXIETY DISORDER, UNSPECIFIED: ICD-10-CM

## 2025-04-29 PROCEDURE — 90837 PSYTX W PT 60 MINUTES: CPT | Mod: 95

## 2025-05-27 ENCOUNTER — APPOINTMENT (OUTPATIENT)
Dept: PSYCHIATRY | Facility: CLINIC | Age: 40
End: 2025-05-27
Payer: COMMERCIAL

## 2025-05-27 DIAGNOSIS — F32.A ANXIETY DISORDER, UNSPECIFIED: ICD-10-CM

## 2025-05-27 DIAGNOSIS — F43.10 POST-TRAUMATIC STRESS DISORDER, UNSPECIFIED: ICD-10-CM

## 2025-05-27 DIAGNOSIS — F41.9 ANXIETY DISORDER, UNSPECIFIED: ICD-10-CM

## 2025-05-27 PROCEDURE — 90837 PSYTX W PT 60 MINUTES: CPT | Mod: 95

## 2025-06-10 ENCOUNTER — APPOINTMENT (OUTPATIENT)
Dept: PSYCHIATRY | Facility: CLINIC | Age: 40
End: 2025-06-10

## 2025-06-24 ENCOUNTER — APPOINTMENT (OUTPATIENT)
Dept: PSYCHIATRY | Facility: CLINIC | Age: 40
End: 2025-06-24
Payer: COMMERCIAL

## 2025-06-24 PROCEDURE — 90837 PSYTX W PT 60 MINUTES: CPT | Mod: 95

## 2025-07-08 ENCOUNTER — APPOINTMENT (OUTPATIENT)
Dept: PSYCHIATRY | Facility: CLINIC | Age: 40
End: 2025-07-08
Payer: COMMERCIAL

## 2025-07-08 PROCEDURE — 90837 PSYTX W PT 60 MINUTES: CPT | Mod: 95

## 2025-07-15 ENCOUNTER — APPOINTMENT (OUTPATIENT)
Dept: PSYCHIATRY | Facility: CLINIC | Age: 40
End: 2025-07-15

## 2025-07-29 ENCOUNTER — APPOINTMENT (OUTPATIENT)
Dept: PSYCHIATRY | Facility: CLINIC | Age: 40
End: 2025-07-29
Payer: COMMERCIAL

## 2025-07-29 PROCEDURE — 90837 PSYTX W PT 60 MINUTES: CPT | Mod: 95

## 2025-08-12 ENCOUNTER — APPOINTMENT (OUTPATIENT)
Dept: PSYCHIATRY | Facility: CLINIC | Age: 40
End: 2025-08-12
Payer: COMMERCIAL

## 2025-08-12 DIAGNOSIS — F43.10 POST-TRAUMATIC STRESS DISORDER, UNSPECIFIED: ICD-10-CM

## 2025-08-12 DIAGNOSIS — F41.9 ANXIETY DISORDER, UNSPECIFIED: ICD-10-CM

## 2025-08-12 DIAGNOSIS — F32.A ANXIETY DISORDER, UNSPECIFIED: ICD-10-CM

## 2025-08-12 PROCEDURE — 90834 PSYTX W PT 45 MINUTES: CPT

## 2025-08-26 ENCOUNTER — APPOINTMENT (OUTPATIENT)
Dept: PSYCHIATRY | Facility: CLINIC | Age: 40
End: 2025-08-26
Payer: COMMERCIAL

## 2025-08-26 DIAGNOSIS — F41.9 ANXIETY DISORDER, UNSPECIFIED: ICD-10-CM

## 2025-08-26 DIAGNOSIS — F43.10 POST-TRAUMATIC STRESS DISORDER, UNSPECIFIED: ICD-10-CM

## 2025-08-26 DIAGNOSIS — F32.A ANXIETY DISORDER, UNSPECIFIED: ICD-10-CM

## 2025-08-26 PROCEDURE — 90837 PSYTX W PT 60 MINUTES: CPT | Mod: 95

## 2025-09-09 ENCOUNTER — APPOINTMENT (OUTPATIENT)
Dept: PSYCHIATRY | Facility: CLINIC | Age: 40
End: 2025-09-09
Payer: COMMERCIAL

## 2025-09-09 DIAGNOSIS — F32.A ANXIETY DISORDER, UNSPECIFIED: ICD-10-CM

## 2025-09-09 DIAGNOSIS — F41.9 ANXIETY DISORDER, UNSPECIFIED: ICD-10-CM

## 2025-09-09 DIAGNOSIS — F43.10 POST-TRAUMATIC STRESS DISORDER, UNSPECIFIED: ICD-10-CM

## 2025-09-09 PROCEDURE — 90837 PSYTX W PT 60 MINUTES: CPT | Mod: 95
